# Patient Record
Sex: FEMALE | Race: WHITE | NOT HISPANIC OR LATINO | Employment: OTHER | URBAN - METROPOLITAN AREA
[De-identification: names, ages, dates, MRNs, and addresses within clinical notes are randomized per-mention and may not be internally consistent; named-entity substitution may affect disease eponyms.]

---

## 2017-04-03 ENCOUNTER — HOSPITAL ENCOUNTER (EMERGENCY)
Facility: CLINIC | Age: 32
Discharge: HOME | End: 2017-04-03
Attending: EMERGENCY MEDICINE | Admitting: EMERGENCY MEDICINE
Payer: MEDICARE

## 2017-04-03 VITALS — TEMPERATURE: 98.5 F | SYSTOLIC BLOOD PRESSURE: 128 MMHG | DIASTOLIC BLOOD PRESSURE: 110 MMHG | OXYGEN SATURATION: 99 %

## 2017-04-03 DIAGNOSIS — S06.0X0A CONCUSSION WITHOUT LOSS OF CONSCIOUSNESS, INITIAL ENCOUNTER: ICD-10-CM

## 2017-04-03 DIAGNOSIS — S00.03XA CONTUSION OF OCCIPITAL REGION OF SCALP, INITIAL ENCOUNTER: ICD-10-CM

## 2017-04-03 PROCEDURE — A9270 NON-COVERED ITEM OR SERVICE: HCPCS | Mod: GY | Performed by: EMERGENCY MEDICINE

## 2017-04-03 PROCEDURE — 99283 EMERGENCY DEPT VISIT LOW MDM: CPT

## 2017-04-03 PROCEDURE — 25000132 ZZH RX MED GY IP 250 OP 250 PS 637: Mod: GY | Performed by: EMERGENCY MEDICINE

## 2017-04-03 PROCEDURE — 25000125 ZZHC RX 250: Performed by: EMERGENCY MEDICINE

## 2017-04-03 RX ORDER — ONDANSETRON 4 MG/1
4 TABLET, ORALLY DISINTEGRATING ORAL EVERY 6 HOURS PRN
Qty: 10 TABLET | Refills: 0 | Status: SHIPPED | OUTPATIENT
Start: 2017-04-03

## 2017-04-03 RX ORDER — ACETAMINOPHEN 500 MG
1000 TABLET ORAL ONCE
Status: DISCONTINUED | OUTPATIENT
Start: 2017-04-03 | End: 2017-04-03

## 2017-04-03 RX ORDER — ONDANSETRON 4 MG/1
4 TABLET, ORALLY DISINTEGRATING ORAL ONCE
Status: DISCONTINUED | OUTPATIENT
Start: 2017-04-03 | End: 2017-04-03

## 2017-04-03 RX ORDER — ACETAMINOPHEN 325 MG/1
975 TABLET ORAL ONCE
Status: COMPLETED | OUTPATIENT
Start: 2017-04-03 | End: 2017-04-03

## 2017-04-03 RX ORDER — ONDANSETRON 4 MG/1
4 TABLET, ORALLY DISINTEGRATING ORAL ONCE
Status: COMPLETED | OUTPATIENT
Start: 2017-04-03 | End: 2017-04-03

## 2017-04-03 RX ADMIN — ONDANSETRON 4 MG: 4 TABLET, ORALLY DISINTEGRATING ORAL at 17:00

## 2017-04-03 RX ADMIN — ACETAMINOPHEN 975 MG: 325 TABLET, FILM COATED ORAL at 17:00

## 2017-04-03 ASSESSMENT — ENCOUNTER SYMPTOMS
NAUSEA: 1
VOMITING: 0
HEADACHES: 1
DIZZINESS: 1

## 2017-04-03 NOTE — ED AVS SNAPSHOT
Emergency Department    [address] [facility] [address]    Phone:  [phone]    Fax:  [phone]                                       Areli Schuster   MRN: [id number]    Department:   Emergency Department   Date of Visit:  4/3/2017           Patient Information     Date Of Birth          1985        Your diagnoses for this visit were:     Contusion of occipital region of scalp, initial encounter     Concussion without loss of consciousness, initial encounter        You were seen by Mathew Camacho MD.      Follow-up Information     Follow up with Dayana Javier    Specialty:  Internal Medicine    Contact information:    [facility]  [address] [facility] [address]  [phone]          Discharge Instructions         Scalp Contusion  A contusion is another word for bruise. It develops when small blood vessels break open and leak blood into the nearby area. A scalp contusion can result from a bump, hit, or fall. Symptoms can include changes in skin color (bruising). For instance, the skin may turn blue or black. Swelling and pain may also occur.  The swelling from the contusion should go down in a few days. Bruising and pain may take longer to go away.   Home care  General care    You may use acetaminophen to control pain, unless another pain medicine was prescribed. Don t take aspirin or NSAIDs (nonsteroidal anti-inflammatory drugs) without talking to your provider first. These medicines increase the risk of bleeding.    To help reduce swelling and pain, apply a cold source to the injured area for up to 20 minutes at a time. Do this as often as directed. Use a cold pack or bag of ice wrapped in a thin towel. Never put a cold source directly on your skin.    If you have cuts or scrapes around the site of the contusion, be sure to care for them as directed.  Note about concussion  Because the injury was to your head, it is possible that a concussion (mild brain  injury) could result. You don t have symptoms of a concussion at this time. But these can show up later. For this reason, you may be told to watch for symptoms of concussion once you re home. Seek emergency medical care if you have any of the symptoms below over the next hours to days:    Headache    Nausea or vomiting    Dizziness    Sensitivity to light or noise    Unusual sleepiness or grogginess    Trouble falling asleep    Personality changes    Vision changes    Memory loss    Confusion    Trouble walking or clumsiness    Loss of consciousness (even for a short time)    Inability to be awakened  During the time period that you re watching for concussion symptoms:    Don t drink alcohol or use sedatives or medicines that make you sleepy.    Don t drive or operate machinery.    Don t do anything strenuous, such as heavy lifting or straining.    Limit tasks that require concentration. This includes reading, watching TV, using a smartphone or computer, and playing video games.    Don t return to sports, exercise, or other activity that could result in another injury.  Ask your healthcare provider when you can safely resume these activities.   Follow-up care  Follow up with your healthcare provider, or as directed. If imaging tests were done, they will be reviewed by a doctor. You will be told the results and any new findings that may affect your care.  When to seek medical advice  Call your healthcare provider right away if any of these occur:    Pain that worsens or that can t be relieved with medicines    New or increased swelling or bruising    Fever of 100.4 F (38 C) or higher, or as directed by your provider    Redness, warmth, or drainage from the injured area    Any depression or bony abnormality in the injured area    Fluid drainage or bleeding from the nose or ears  Call 911  Call 911 right away if any of these occur:    Stiff neck    Weakness or numbness in any part of the body    Seizures         "[id number] [employer]. [address] [place] [address], [place] [address]. All rights reserved. This information is not intended as a substitute for professional medical care. Always follow your healthcare professional's instructions.          Concussion (No Wake-Up)    A concussion happens when you hit your head with enough force to shake up the brain. This may cause you to lose consciousness - be \"knocked out\" - but not always. Depending on how hard you hit your head, it will take from a few hours up to a few days to get better. Sometimes symptoms may last a few months or longer. This is called post-concussion syndrome.  At first, you may have a headache, nausea, vomiting, or dizziness. You may also have problems concentrating or remembering things. This is normal.  Symptoms should get better as the hours and days go by. Symptoms that get worse could be a sign of a more serious injury. This might be a bruise or bleeding in the brain. That s why it s important to watch for the warning signs listed below.  Home care  Follow these tips to help care for yourself at home:    During the next day (24 hours) someone must stay with you to check for the signs below.    If your face or scalp swells, apply an ice pack for 20 minutes every 1 to 2 hours. Do this until the swelling starts to go down. You can make an ice pack by putting ice cubes in a plastic bag and wrapping the bag in a towel. for 20 minutes every 1-2 hours until the swelling starts to go down.    You may use acetaminophen to control pain, unless another pain medicine was prescribed. If you have chronic liver or kidney disease, talk with your doctor before using these medicines. Also talk with your doctor if you ever had a stomach ulcer or GI bleeding.    For the next 24 hours:    Don t drink alcohol or take sedatives or medicines that make you sleepy.    Don t drive or operate machinery.    Avoid doing anything strenuous. Don t lift or strain.    Don t " return to sports or any activity that could cause you to hit your head until all symptoms are gone and you have been cleared by your doctor. A second head injury before fully recovering from the first one can lead to serious brain injury.  Follow-up care  Follow up with your doctor in 1 week, or as directed.  Note: A radiologist will review any X-rays or CT scans that were taken. You will be told of any new findings that may affect your care.  When to seek medical care  Get prompt medical attention if any of these occur:    Repeated vomiting    Headache or dizziness that is severe or gets worse    Unusual drowsiness, or unable to wake up as usual    Confusion or change in behavior or speech, or memory loss    Blurred vision    Convulsion (seizure)    Swelling on the scalp or face that gets worse    Redness, warmth, or pus from the swollen area    Fluid draining from or bleeding from the nose or ears  Â   Â  [id number] [employer]. [address] [place] [address]. All rights reserved. This information is not intended as a substitute for professional medical care. Always follow your healthcare professional's instructions.          24 Hour Appointment Hotline       To make an appointment at any [facility], call [phone] ([phone]). If you don't have a family doctor or clinic, we will help you find one. [facility] are conveniently located to serve the needs of you and your family.             Review of your medicines      START taking        Dose / Directions Last dose taken    ondansetron 4 MG ODT tab   Commonly known as:  ZOFRAN ODT   Dose:  4 mg   Quantity:  10 tablet        Place 1 tablet (4 mg) under the tongue every 6 hours as needed for nausea   Refills:  0          Our records show that you are taking the medicines listed below. If these are incorrect, please call your family doctor or clinic.        Dose / Directions Last dose taken    ADVAIR HFA IN        Refills:   0        ALBUTEROL IN        Refills:  0        HYDROcodone-acetaminophen 5-325 MG per tablet   Commonly known as:  NORCO   Dose:  1-2 tablet   Quantity:  10 tablet        Take 1-2 tablets by mouth every 4 hours as needed for moderate to severe pain   Refills:  0        ibuprofen 600 MG tablet   Commonly known as:  ADVIL/MOTRIN   Dose:  600 mg   Quantity:  21 tablet        Take 1 tablet (600 mg) by mouth every 6 hours as needed for moderate pain   Refills:  0        oxyCODONE 5 MG IR tablet   Commonly known as:  ROXICODONE   Dose:  5 mg   Quantity:  10 tablet        Take 1 tablet (5 mg) by mouth every 6 hours as needed for moderate to severe pain   Refills:  0        RITALIN PO        Refills:  0        WELLBUTRIN PO        Refills:  0        ZOFRAN PO        Refills:  0                Prescriptions were sent or printed at these locations (1 Prescription)                   Other Prescriptions                Printed at Department/Unit printer (1 of 1)         ondansetron (ZOFRAN ODT) 4 MG ODT tab                Orders Needing Specimen Collection     None      Pending Results     No orders found from 4/1/2017 to 4/4/2017.            Pending Culture Results     No orders found from 4/1/2017 to 4/4/2017.             Test Results from your hospital stay            Clinical Quality Measure: Blood Pressure Screening     Your blood pressure was checked while you were in the emergency department today. The last reading we obtained was  BP: (!) 128/110 . Please read the guidelines below about what these numbers mean and what you should do about them.  If your systolic blood pressure (the top number) is less than 120 and your diastolic blood pressure (the bottom number) is less than 80, then your blood pressure is normal. There is nothing more that you need to do about it.  If your systolic blood pressure (the top number) is 120-139 or your diastolic blood pressure (the bottom number) is 80-89, your blood pressure may be  "higher than it should be. You should have your blood pressure rechecked within a year by a primary care provider.  If your systolic blood pressure (the top number) is 140 or greater or your diastolic blood pressure (the bottom number) is 90 or greater, you may have high blood pressure. High blood pressure is treatable, but if left untreated over time it can put you at risk for heart attack, stroke, or kidney failure. You should have your blood pressure rechecked by a primary care provider within the next 4 weeks.  If your provider in the emergency department today gave you specific instructions to follow-up with your doctor or provider even sooner than that, you should follow that instruction and not wait for up to 4 weeks for your follow-up visit.        Thank you for choosing [facility]       Thank you for choosing [facility] for your care. Our goal is always to provide you with excellent care. Hearing back from our patients is one way we can continue to improve our services. Please take a few minutes to complete the written survey that you may receive in the mail after you visit with us. Thank you!        [employer] Information     [employer] lets you send messages to your doctor, view your test results, renew your prescriptions, schedule appointments and more. To sign up, go to [email or web address] . Click on \"Log in\" on the left side of the screen, which will take you to the Welcome page. Then click on \"Sign up Now\" on the right side of the page.     You will be asked to enter the access code listed below, as well as some personal information. Please follow the directions to create your username and password.     Your access code is: [email or web address]  Expires: 2017  5:02 PM     Your access code will  in 90 days. If you need help or a new code, please call your [facility] clinic or [phone].        Care EveryWhere ID     This is your Care EveryWhere ID. This could be used by other organizations to access your " [facility] medical records  [id number]        After Visit Summary       This is your record. Keep this with you and show to your community pharmacist(s) and doctor(s) at your next visit.

## 2017-04-03 NOTE — ED PROVIDER NOTES
History     Chief Complaint:  Fall      HPI   Areli Schuster is a 31 year old female with a history of cerebral palsy, TMJ, and concussions who presents for evaluation after a fall. The patient fell off of a bar stool two hours ago and shortly thereafter developed a headache, nausea without vomiting, dizziness, and jaw soreness, potentially from hitting her teeth on the bar stool during the fall.  The patient also requests a work note for today and tomorrow.     Allergies:  Celebrex    Medications:    Hydrocodone-acetaminophen (norco)   Ibuprofen (advil,motrin)   Fluticasone-salmeterol (advair hfa in)  Methylphenidate hcl (ritalin po)  Albuterol   Oxycodone (roxicodone)  Bupropion hcl (wellbutrin po)  Ondansetron hcl (zofran po)    Past Medical History:    Cerebral palsy  Depressive disorder  Gastro-oesophageal reflux disease    Past Surgical History:    Orthopedic surgery    Family History:    History reviewed.  No significant family history.      Social History:  Relationship status:    Tobacco use: Neg  Alcohol use: Neg  The patient presents alone.      Review of Systems   Gastrointestinal: Positive for nausea. Negative for vomiting.   Neurological: Positive for dizziness and headaches.   All other systems reviewed and are negative.    Physical Exam   First Vitals:  BP: 128/110  Temp: 98.5  F (36.9  C)  Temp src: Oral  SpO2: 99 % (04/03 1653)   Physical Exam  Constitutional: The patient is oriented to person, place, and time.   HENT:   Head: Tenderness and swelling of the occiput. Tender on left side of the jaw, but no step-offs or crepitous.  Right Ear: Normal  Left Ear: Normal  Nose: Nose normal.   Mouth/Throat: Oropharynx is clear and moist. No erythema or exudate.   Eyes: Conjunctivae and EOM are normal. Pupils are equal, round, and reactive to light. No discharge  Neck: Normal range of motion. Neck supple.   Cardiovascular: Normal rate, regular rhythm, no murmur gallops or rubs. Intact distal  pulses.    Pulmonary/Chest: CTA bilaterally. No wheezes rale or rhonchi.  Abdominal: Soft. Non tender.  No masses   Musculoskeletal: No edema. No bony deformity. Normal range of motion. No spinal tenderness.   Lymphadenopathy:     The patient has no cervical adenopathy.   Neurological: The patient is alert and oriented to person, place, and time. The patient has normal strength and normal reflexes. No cranial nerve deficit. Coordination normal.   Skin: Skin is warm and dry. No rash noted. The patient is not diaphoretic.   Psychiatric: The patient has a normal mood and affect.     Emergency Department Course   Interventions:  1700: Tylenol, 675 mg, PO  1700: Zofran-ODT, 4 mg, PO    Emergency Department Course:  Nursing notes and vitals reviewed.  I performed an exam of the patient as documented above.  Interventions were given to relieve discomfort.    Findings and plan explained to the Patient. Patient discharged home, status improved, with instructions regarding supportive care, medications, and reasons to return as well as the importance of close follow-up was reviewed.     Impression & Plan    Medical Decision Making:  Areli Schuster is a 31 year old female who presents after slipping off of a stool and striking the back of her head. She had no loss of consciousness, but has been quite nauseous and dizzy since the event. Physical exam is unremarkable, there is slight occipital tenderness. I suspect that she has a concussion based on her symptoms. Low concern for intracranial injury. I do not feel the CT is indicated. She can be safely discharged to home. She can use Tylenol or Motrin for pain, Zofran for nausea or vomiting, follow up with her PCP, and return for any problems.       Diagnosis:    ICD-10-CM    1. Contusion of occipital region of scalp, initial encounter S00.03XA    2. Concussion without loss of consciousness, initial encounter S06.0X0A        Disposition:  discharged to home    Discharge  Medication List as of 4/3/2017  5:02 PM      START taking these medications    Details   ondansetron (ZOFRAN ODT) 4 MG ODT tab Place 1 tablet (4 mg) under the tongue every 6 hours as needed for nausea, Disp-10 tablet, R-0, Local Print           I, Ana Herman, am serving as a scribe on 4/3/2017 at 4:52 PM to personally document services performed by Mathew Camacho MD, based on my observations and the provider's statements to me.     EMERGENCY DEPARTMENT       Mathew Camacho MD  04/03/17 2014

## 2017-04-03 NOTE — ED AVS SNAPSHOT
Emergency Department    [address] [place] [address]    Phone:  [phone]    Fax:  [phone]                                       Areli Schuster   MRN: [id number]    Department:   Emergency Department   Date of Visit:  4/3/2017           After Visit Summary Signature Page     I have received my discharge instructions, and my questions have been answered. I have discussed any challenges I see with this plan with the nurse or doctor.    ..........................................................................................................................................  Patient/Patient Representative Signature      ..........................................................................................................................................  Patient Representative Print Name and Relationship to Patient    ..................................................               ................................................  Date                                            Time    ..........................................................................................................................................  Reviewed by Signature/Title    ...................................................              ..............................................  Date                                                            Time

## 2017-04-03 NOTE — DISCHARGE INSTRUCTIONS
Scalp Contusion  A contusion is another word for bruise. It develops when small blood vessels break open and leak blood into the nearby area. A scalp contusion can result from a bump, hit, or fall. Symptoms can include changes in skin color (bruising). For instance, the skin may turn blue or black. Swelling and pain may also occur.  The swelling from the contusion should go down in a few days. Bruising and pain may take longer to go away.   Home care  General care    You may use acetaminophen to control pain, unless another pain medicine was prescribed. Don t take aspirin or NSAIDs (nonsteroidal anti-inflammatory drugs) without talking to your provider first. These medicines increase the risk of bleeding.    To help reduce swelling and pain, apply a cold source to the injured area for up to 20 minutes at a time. Do this as often as directed. Use a cold pack or bag of ice wrapped in a thin towel. Never put a cold source directly on your skin.    If you have cuts or scrapes around the site of the contusion, be sure to care for them as directed.  Note about concussion  Because the injury was to your head, it is possible that a concussion (mild brain injury) could result. You don t have symptoms of a concussion at this time. But these can show up later. For this reason, you may be told to watch for symptoms of concussion once you re home. Seek emergency medical care if you have any of the symptoms below over the next hours to days:    Headache    Nausea or vomiting    Dizziness    Sensitivity to light or noise    Unusual sleepiness or grogginess    Trouble falling asleep    Personality changes    Vision changes    Memory loss    Confusion    Trouble walking or clumsiness    Loss of consciousness (even for a short time)    Inability to be awakened  During the time period that you re watching for concussion symptoms:    Don t drink alcohol or use sedatives or medicines that make you sleepy.    Don t drive or operate  "machinery.    Don t do anything strenuous, such as heavy lifting or straining.    Limit tasks that require concentration. This includes reading, watching TV, using a smartphone or computer, and playing video games.    Don t return to sports, exercise, or other activity that could result in another injury.  Ask your healthcare provider when you can safely resume these activities.   Follow-up care  Follow up with your healthcare provider, or as directed. If imaging tests were done, they will be reviewed by a doctor. You will be told the results and any new findings that may affect your care.  When to seek medical advice  Call your healthcare provider right away if any of these occur:    Pain that worsens or that can t be relieved with medicines    New or increased swelling or bruising    Fever of 100.4 F (38 C) or higher, or as directed by your provider    Redness, warmth, or drainage from the injured area    Any depression or bony abnormality in the injured area    Fluid drainage or bleeding from the nose or ears  Call 911  Call 911 right away if any of these occur:    Stiff neck    Weakness or numbness in any part of the body    Seizures        3674-8802 [employer]. [address] [place] [address]. All rights reserved. This information is not intended as a substitute for professional medical care. Always follow your healthcare professional's instructions.          Concussion (No Wake-Up)    A concussion happens when you hit your head with enough force to shake up the brain. This may cause you to lose consciousness - be \"knocked out\" - but not always. Depending on how hard you hit your head, it will take from a few hours up to a few days to get better. Sometimes symptoms may last a few months or longer. This is called post-concussion syndrome.  At first, you may have a headache, nausea, vomiting, or dizziness. You may also have problems concentrating or remembering things. This is " normal.  Symptoms should get better as the hours and days go by. Symptoms that get worse could be a sign of a more serious injury. This might be a bruise or bleeding in the brain. That s why it s important to watch for the warning signs listed below.  Home care  Follow these tips to help care for yourself at home:    During the next day (24 hours) someone must stay with you to check for the signs below.    If your face or scalp swells, apply an ice pack for 20 minutes every 1 to 2 hours. Do this until the swelling starts to go down. You can make an ice pack by putting ice cubes in a plastic bag and wrapping the bag in a towel. for 20 minutes every 1-2 hours until the swelling starts to go down.    You may use acetaminophen to control pain, unless another pain medicine was prescribed. If you have chronic liver or kidney disease, talk with your doctor before using these medicines. Also talk with your doctor if you ever had a stomach ulcer or GI bleeding.    For the next 24 hours:    Don t drink alcohol or take sedatives or medicines that make you sleepy.    Don t drive or operate machinery.    Avoid doing anything strenuous. Don t lift or strain.    Don t return to sports or any activity that could cause you to hit your head until all symptoms are gone and you have been cleared by your doctor. A second head injury before fully recovering from the first one can lead to serious brain injury.  Follow-up care  Follow up with your doctor in 1 week, or as directed.  Note: A radiologist will review any X-rays or CT scans that were taken. You will be told of any new findings that may affect your care.  When to seek medical care  Get prompt medical attention if any of these occur:    Repeated vomiting    Headache or dizziness that is severe or gets worse    Unusual drowsiness, or unable to wake up as usual    Confusion or change in behavior or speech, or memory loss    Blurred vision    Convulsion (seizure)    Swelling on the  scalp or face that gets worse    Redness, warmth, or pus from the swollen area    Fluid draining from or bleeding from the nose or ears  Â   Â  5158-6802 [employer]. [address] [place] [address], [place] [address]. All rights reserved. This information is not intended as a substitute for professional medical care. Always follow your healthcare professional's instructions.

## 2017-04-03 NOTE — LETTER
EMERGENCY DEPARTMENT  [address] [facility] [place] [address]  [phone]    April 3, 2017    Areli Schuster  [address] [facility] [address] [phone] (home) none (work)    : 1985      To Whom it may concern:    Areli Schuster was seen in our Emergency Department today, April 3, 2017.  I expect her condition to improve over the next 2 days.  She may return to work/school when improved.    Sincerely,        Mathew Camacho

## 2019-08-03 ENCOUNTER — HOSPITAL ENCOUNTER (EMERGENCY)
Facility: CLINIC | Age: 34
End: 2019-08-03

## 2019-08-03 ENCOUNTER — HOSPITAL ENCOUNTER (EMERGENCY)
Facility: CLINIC | Age: 34
Discharge: HOME | End: 2019-08-03
Attending: EMERGENCY MEDICINE | Admitting: EMERGENCY MEDICINE
Payer: MEDICARE

## 2019-08-03 VITALS
OXYGEN SATURATION: 97 % | WEIGHT: 145 LBS | RESPIRATION RATE: 18 BRPM | HEIGHT: 63 IN | BODY MASS INDEX: 25.69 KG/M2 | TEMPERATURE: 98.6 F | SYSTOLIC BLOOD PRESSURE: 122 MMHG | DIASTOLIC BLOOD PRESSURE: 80 MMHG

## 2019-08-03 DIAGNOSIS — G80.9 CEREBRAL PALSY, UNSPECIFIED TYPE (H): ICD-10-CM

## 2019-08-03 DIAGNOSIS — G89.29 CHRONIC LOW BACK PAIN, UNSPECIFIED BACK PAIN LATERALITY, WITH SCIATICA PRESENCE UNSPECIFIED: ICD-10-CM

## 2019-08-03 DIAGNOSIS — M54.5 CHRONIC LOW BACK PAIN, UNSPECIFIED BACK PAIN LATERALITY, WITH SCIATICA PRESENCE UNSPECIFIED: ICD-10-CM

## 2019-08-03 DIAGNOSIS — M43.16 SPONDYLOLISTHESIS OF LUMBAR REGION: ICD-10-CM

## 2019-08-03 PROCEDURE — 25000132 ZZH RX MED GY IP 250 OP 250 PS 637: Mod: GY | Performed by: EMERGENCY MEDICINE

## 2019-08-03 PROCEDURE — 99283 EMERGENCY DEPT VISIT LOW MDM: CPT

## 2019-08-03 RX ORDER — OXYCODONE AND ACETAMINOPHEN 5; 325 MG/1; MG/1
2 TABLET ORAL ONCE
Status: COMPLETED | OUTPATIENT
Start: 2019-08-03 | End: 2019-08-03

## 2019-08-03 RX ORDER — LIDOCAINE 4 G/G
1 PATCH TOPICAL
Status: DISCONTINUED | OUTPATIENT
Start: 2019-08-03 | End: 2019-08-03

## 2019-08-03 RX ORDER — LIDOCAINE 4 G/G
1 PATCH TOPICAL
Status: DISCONTINUED | OUTPATIENT
Start: 2019-08-03 | End: 2019-08-03 | Stop reason: HOSPADM

## 2019-08-03 RX ORDER — LIDOCAINE 50 MG/G
1 PATCH TOPICAL EVERY 24 HOURS
Qty: 10 PATCH | Refills: 0 | Status: SHIPPED | OUTPATIENT
Start: 2019-08-03 | End: 2019-08-13

## 2019-08-03 RX ADMIN — OXYCODONE HYDROCHLORIDE AND ACETAMINOPHEN 2 TABLET: 5; 325 TABLET ORAL at 17:24

## 2019-08-03 ASSESSMENT — MIFFLIN-ST. JEOR: SCORE: 1318.91

## 2019-08-03 NOTE — ED PROVIDER NOTES
"  History   Chief Complaint:  Back Pain    HPI   Areli Nj is a 33 year old female with a history of cerebral palsy and chronic back pain with fibromyalgia who presents with back pain. The patient reports that she has recently had lower back pain increased from her chronic pain. She states that she has spinal surgery planned for the near future but is awaiting insurance approval; no date is set yet.  She was seen on 07/31 for chronic back pain and spondylolisthesis and was sent home with a prescription for 40 x 5 mg tablets of oxycodone for the pain. She was instructed to set up an appointment with pain management which has been scheduled for 08/21. The patient states that she is continuing to have the pain despite taking the oxycodone (2-3 tabs daily) and up to 4000 mg of Tylenol per day. She did take three oxycodone today and her last Tylenol was at 0800 this morning. Neither of these medications yielded satisfactory relief of pain, prompting her to come to the ED this evening. In the ED, the patient reports that her pain is worse with movement. At baseline, the patient has a \"limited\" ability to walk and uses a wheelchair to ambulate when pain is severe, which she attributes to her baseline cerebral palsy.  No flank pain or new urinary symptoms.  No history of kidney stones.  Her last period was about two weeks ago, normal for her. The patient denies bladder incontinence and dysuria.  No changes in bowel movements.     Allergies:  Celebrex     Medications:    Albuterol inhaler  Bupropion  Advair inhaler  Oxycodone  Ritalin     Past Medical History:    Cerebral palsy  Depressive disorder  GERD  Low back pain  Asthma  Chronic pain with fibromyalgia      Past Surgical History:    Subtalar arthroereisis      Family History:    History reviewed. No pertinent family history.      Social History:  Smoking status: Never smoker  Alcohol use: No  Drug use: No     Review of Systems   All other systems reviewed and " "are negative.      Physical Exam   Patient Vitals for the past 24 hrs:   BP Temp Temp src Heart Rate Resp SpO2 Height Weight   08/03/19 1752 122/80 -- -- -- -- -- -- --   08/03/19 1701 (!) 118/91 98.6  F (37  C) Oral 100 18 97 % 1.588 m (5' 2.5\") 65.8 kg (145 lb)     Physical Exam  General: Uncomfortable but nontoxic-appearing woman recumbent in room 2, several companions at bedside  HENT: mucous membranes moist  CV: rate as above, regular rhythm, no murmur audible, normal foot pulses  Resp: normal effort, clear throughout, no crackles or wheezing  GI: abdomen soft and nontender, no guarding  MSK:   Cervical spine: nontender with FROM  Thoracic spine: nontender, no CVAT  Lumbar spine: Mild diffuse tenderness to lower back   pelvis stable.  Can lift both legs off bed.    Skin: appropriately warm and dry, no erythema/ecchymosis/vesicles to back  Neuro: alert, clear speech, oriented, hip flexion/extension limited slightly by pain, SILT bilateral legs and feet  Psych: initially tearful, cooperative      Emergency Department Course     Interventions:  1724: Percocet 2 tablets PO  lidoderm patch topical    Emergency Department Course:  Past medical records, nursing notes, and vitals reviewed.  1708: I performed an exam of the patient as documented above. Clinical findings and plan explained to the patient.     I performed electronic chart review in [facility].  I looked up this patient's record in the [facility].    Patient discharged home with instructions regarding supportive care, medications, and reasons to return as well as the importance of close follow-up were reviewed.     Impression & Plan    Medical Decision Making:  Based on her history and exam, I think that her presenting discomfort is a manifestation of an exacerbation of her long-standing lower back pain.  She has been previously diagnosed with spondylolisthesis and is awaiting surgery though there are some insurance barriers.  She does not have any recent trauma, " fevers, or focal neurologic signs or symptoms to suggest the presence of a more dangerous etiology such as epidural abscess, hematoma, fracture, osteomyelitis, or other process such as kidney stone or pyelonephritis or vascular pathology, and so the patient and I readily agreed to defer any emergent testing.  I discussed her chronic and recurring pain policy with the patient.  I advised her that there is room to increase her oxycodone dosing, though she should do this only in conjunction with her primary clinic so that she does not run out before expected based on the amount prescribed.  No signs of intoxication or withdrawal state.  I do think the patient is in legitimate discomfort, and she was treated with oral opioids and given a Lidoderm patch.  The importance of close outpatient follow-up was reviewed.  She is encouraged to return here for sudden worsening at any hour.    Diagnosis:    ICD-10-CM    1. Chronic low back pain, unspecified back pain laterality, with sciatica presence unspecified M54.5     G89.29    2. Spondylolisthesis of lumbar region M43.16    3. Cerebral palsy, unspecified type (H) G80.9        Disposition:  Discharged to home.    Discharge Medications:     Started    lidocaine 5 % patch  Commonly known as:  LIDODERM  1 patch, Transdermal, EVERY 24 HOURS          This record was created at least in part using electronic voice recognition software, so please excuse any typographical errors.    Rhett Meeks  8/3/2019    EMERGENCY DEPARTMENT  I, Rhett Meeks, am serving as a scribe at 5:08 PM on 8/3/2019 to document services personally performed by Dionicio Simpson MD based on my observations and the provider's statements to me.        Dionicio Simpson MD  08/03/19 8848

## 2019-08-03 NOTE — ED TRIAGE NOTES
Patient complaining of worsening back pain.  Patient states that she is waiting for spinal surgery.

## 2019-08-03 NOTE — DISCHARGE INSTRUCTIONS
Discharge Instructions  Back Pain  You were seen today for back pain. Back pain can have many causes, but most will get better without surgery or other specific treatment. Sometimes there is a herniated ( slipped ) disc. We do not usually do MRI scans to look for these right away, since most herniated discs will get better on their own with time.  Today, we did not find any evidence that your back pain was caused by a serious condition. However, sometimes symptoms develop over time and cannot be found during an emergency visit, so it is very important that you follow up with your primary provider.  Generally, every Emergency Department visit should have a follow-up clinic visit with either a primary or a specialty clinic/provider. Please follow-up as instructed by your emergency provider today.    Return to the Emergency Department if:  You develop a fever with your back pain.   You have weakness or change in sensation in one or both legs.  You lose control of your bowels or bladder, or cannot empty your bladder (cannot pee).  Your pain gets much worse.     Follow-up with your provider:  Unless your pain has completely gone away, please make an appointment with your provider within one week. Most of the routine care for back pain is available in a clinic and not the Emergency Department. You may need further management of your back pain, such as more pain medication, imaging such as an X-ray or MRI, or physical therapy.    What can I do to help myself?  Remain Active -- People are often afraid that they will hurt their back further or delay recovery by remaining active, but this is one of the best things you can do for your back. In fact, staying in bed for a long time to rest is not recommended. Studies have shown that people with low back pain recover faster when they remain active. Movement helps to bring blood flow to the muscles and relieve muscle spasms as well as preventing loss of muscle strength.  Heat --  Using a heating pad can help with low back pain during the first few weeks. Do not sleep with a heating pad, as you can be burned.   Pain medications - You may take a pain medication such as Tylenol  (acetaminophen), Advil , Motrin  (ibuprofen) or Aleve  (naproxen).  If you were given a prescription for medicine here today, be sure to read all of the information (including the package insert) that comes with your prescription.  This will include important information about the medicine, its side effects, and any warnings that you need to know about.  The pharmacist who fills the prescription can provide more information and answer questions you may have about the medicine.  If you have questions or concerns that the pharmacist cannot address, please call or return to the Emergency Department.   Remember that you can always come back to the Emergency Department if you are not able to see your regular provider in the amount of time listed above, if you get any new symptoms, or if there is anything that worries you.      Discharge Instructions  Chronic Pain Management  You were seen today for an issue regarding chronic or recurrent pain. You may have a condition that gives you pain every day, or a condition that causes pain that keeps coming back, or several conditions involving pain.   We will evaluate you for your symptoms to ensure that there is no medical emergency. This evaluation usually takes the form of a good medical history (interview) and physical examination. Rarely, imaging (x-rays or CAT scans) and lab work (blood tests) may be necessary in addition to the history and examination. This approach is similar to our approach to other chronic/recurrent diseases where we evaluate for emergencies but leave much of the management of the problem to the regular provider/clinic.  We will offer suggestions to manage your pain but we do not generally utilize opiate pain medications for recurrent or chronic pain. There  is an ongoing public health crisis with respect to opiates and we are aware of the risks to our patients from opiate pain medications. Opiates are strong pain relievers but they carry significant risks including sedation, confusion, constipation, falls, as well as dependence, addiction, and overdose-related deaths. These medications represent a significant risk to your health and are therefore reserved for the management of select conditions associated with acute, severe pain. For many conditions, the risks of opiate treatment simply outweigh the benefits. Additionally, for patients with chronic/recurrent pain or who frequently use opiates, management of pain needs to be performed by a single physician/provider who can follow their care consistently. As a result, we generally do not provide refills of opiates or treat chronic pain with injected opiates in the Emergency Department. It is with your best interests in mind that we adhere to these widely accepted best practices.    As with other chronic diseases like diabetes and asthma, management of chronic pain is best performed by regular visits to the same provider. In the case of chronic pain, this may be your primary physician/provider, your neurologist or other specialist, or with a chronic pain clinic/provider.  If you have concerns about our policy, please discuss them with your primary care provider or pain specialist, who can contact us if necessary for further information or clarification.  If you were given a prescription for medicine here today, be sure to read all of the information (including the package insert) that comes with your prescription.  This will include important information about the medicine, its side effects, and any warnings that you need to know about.  The pharmacist who fills the prescription can provide more information and answer questions you may have about the medicine.  If you have questions or concerns that the pharmacist cannot  address, please call or return to the Emergency Department.   Remember that you can always come back to the Emergency Department if you develop any new symptoms or if there is anything that worries you.

## 2019-08-03 NOTE — ED AVS SNAPSHOT
Emergency Department  [address] [place] [address]  Phone:  [phone]  Fax:  [phone]                                    Areli Nj   MRN: [id number]    Department:   Emergency Department   Date of Visit:  8/3/2019           After Visit Summary Signature Page    I have received my discharge instructions, and my questions have been answered. I have discussed any challenges I see with this plan with the nurse or doctor.    ..........................................................................................................................................  Patient/Patient Representative Signature      ..........................................................................................................................................  Patient Representative Print Name and Relationship to Patient    ..................................................               ................................................  Date                                   Time    ..........................................................................................................................................  Reviewed by Signature/Title    ...................................................              ..............................................  Date                                               Time          22EPIC Rev 08/18

## 2019-10-15 ENCOUNTER — HOSPITAL ENCOUNTER (OUTPATIENT)
Dept: OCCUPATIONAL THERAPY | Facility: CLINIC | Age: 34
Setting detail: THERAPIES SERIES
End: 2019-10-15
Attending: PHYSICIAN ASSISTANT
Payer: MEDICARE

## 2019-10-15 PROCEDURE — 97542 WHEELCHAIR MNGMENT TRAINING: CPT | Mod: GO | Performed by: OCCUPATIONAL THERAPIST

## 2019-10-15 NOTE — PROGRESS NOTES
"   SEATING AND WHEELED MOBILITY ASSESSMENT  10/15/19 0800   Quick Adds   Quick Adds Certification;Current Power Wheelchair   General Information    Rehab Discipline OT   Funding Medicare/MA   Service Outpatient;Occupational Therapy;Seating/Wheeled Mobility Evaluation   Height 5'2\"   Weight 146  (230# when chair was obtained)   Start Of Care Date 10/15/19   Referring Physician Dayana Javier   Orders Evaluate And Treat As Indicated;Per Therapist Evaluation   Orders Date 09/19/19   Others Present at Evaluation Friend   Patient/Caregiver Goals Power mobility   Rehabilitation Technology Supplier Iesha [employer] from [employer]   Current Community Support Personal Care Attendant;Meals On Wheels;Transportation Service  (6.25 hrs/day, )   Patient role/Employment history Disabled;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 Yes   Have you fallen and had an injury in the past year? Yes  (concussions)   Is patient a fall risk? Yes   Fall screen comments falling a few times a week   Medical History   Onset Of Illness/injury Or Date Of Surgery 9/19/19   Medical Diagnosis Spastic Cereral Palsy   Medical History Depression, GERD, Chronic pain and fatigue, Fibromyalgia   Recent or Planned Surgeries L4-5 Spondylothesis needing lumbar fusion surgery, R carpal tunnel.  Mutliple LB surgery as a child due to orthopedic surgeries   Current Power Wheelchair   Age 4 years old    Quantum Q6 Edge   Cushion   (foam)   Back Solid Curved   Footrest Style Center mount   Headrest Yes   Settings Used Home;Outdoor Community Mobility;Primary Means of Transportation   Condition Beyond Further Justifiable Repair   Positioning Features Tilt Seat   Power Control Left Joystick   Current Equipment Requires Replacement   Rational for Equipment Changes HEavy duty full time user   Power Wheelchair Comments Major weight loss since obtaining " chair.  Back surgery coming up    Home Accessibility   Living Environment House   Primary Entrance Exposed Ramp   All Rooms Wheelchair Accessible Yes   Home Accessibility Comments Stairs up to bedroom    Community ADL   Transportation Transportation Services   Community Mobility Requirements Medical Appointments;Shopping   Cognitive/Visual/Hearing Status   Observations No Problems Observed During Evaluation   Vision Intact  (lasik)   Hearing Intact   ADL Status   Feeding Independent   Grooming/Hygiene Requires Assist   Dressing Requires Assist   Toileting Requires Assist;Uses Equipment;Incontinent   Bathing Requires Assist;Uses Equipment  (grab bars, shower chair)   Meal Preparation Unable   Home Management Unable   Fatigue   Fatigue Measure Score I need to nap suddently.  Daily activitie are a challenge   Balance   Unsupported Sitting Balance Uses Upper Extremities for Balance   Sitting Balance in Chair Uses Upper Extremities for Balance   Standing Balance Uses Upper Extremities for Balance   Ambulation   Ambulation Ambulatory   Ambulation Assist Requires Assist   Ambulation Equipment 4 Wheeled Walker with Seat   Ambulation Comments 10-50 depending on fatigue and pain.  Always with assist and a walker.  Then very fatigued and in pain.   Transfers   Transfer Assist Moderate Assist   Transfer Method Stand Pivot   Sleep/Rest   Sleep Surface/Equipment Regular bed and rail   Wheelchair Ability   Wheelchair Ability Quick Adds Power Chair;Wheelchair Use   Power Wheelchair Propulsion   Operate Power Wheelchair Standard Joystick;Independent   Wheelchair Use   Ability to Perform Weight Shifts Assist   Bed Confined Without Wheelchair? Yes   Hours in Wheelchair Daily 8   Hours Spent Alone Daily 12  (rare)   Neuromuscular   History of Pressure Sores No   Current Pressure Sores No   Pain Yes   Pain Location Low back   Pain Scale  9  (awaiting surgery)   Coordination UE Impaired;LE Impaired  (spastic)   Tone Spasticity   Sensory  Deficits Reported Arms and legs    Head and Neck   Head and Neck Position Functional   Upper Extremities   Shoulder Position Functional on Right   UE ROM Full ROM with L spasticity >R limiting end ranges   UE Strength 4/5   Dominance Right   Pelvis   Anterior/Posterior Pelvis Position Posterior Tilt   Trunk   Anterior/Posterior Trunk Position Increased Thoracic Kyphosis   Lower Extremities   Hip Position Adducted;Partially Flexible   LE Strength 2+/5   Foot Positioning Plantar flexed   LE Comments Edema in LE with prolonged sitting   Patient Measurements   Other see atp notes   Education Assessment   Barriers to Learning Physical   Preferred Learning Style Listening;Demonstration   Assessment/Plan   Criteria for Skilled Interventions Met Yes, Treatment Indicated   Treatment Diagnosis impaired participation in MRADLS   Therapy Frequency once   Planned Therapy Interventions Wheelchair Management/Propulsion Training   Planned Therapy Interventions Comments Educated patient on recline, seat elevator and power elevating leg seat options to assist with increased wheelchair use due to progressing back pain and need for surgery.   Mesurements and assesment of chair completed.  Carney depth needed to accomodate weight loss.   Risks and benefits of treatment have been explained Yes   Patient/family & other staff in agreement with plan of care Yes   Comments Specs to be completed   Session Time   OT Wheelchair Management Minutes (89189) 60   Certification   Certification date from 10/15/19   Certification date to 10/15/19   Adult OT Eval Goals   OT Eval Goals (Adult) 1    OT Goal 1   Goal Identifier power mobility   Goal Description Patient/family demonstrates understanding of equipment for independent mobility, including benefits/limitations   Target Date 10/15/19   Date Met 10/15/19   Electronically signed by:  Sweta LASSITER, ATP      Occupational Therapist, Assistive   [phone]      fax:  [phone]      [email or web address], [address]  [address]  [place]   [address]

## 2019-11-15 NOTE — PROGRESS NOTES
REQUISITION AND JUSTIFICATION FOR DURABLE MEDICAL EQUIPMENT    Patient Name:  Areli Nj  MR #:  [id number]  :  1985  Age/Gender:  34 year old female  Visit Date:  Areli Nj seen for seating and wheeled mobility evaluation by Sweta SAMUEL/L,ATP and ATP from [facility] on 10/15/19.    CLINICAL CRITERIA FOR MOBILITY ASSISTIVE EQUIPMENT  Coverage Criteria Per Local Coverage Determination  A) Areli has mobility limitations due to Spastic Cerebral Palsy, Depression, GERD, Chronic Pain, fibromyalgia, R carpal tunnel surgery, L 4-5 fusion to be done sonn that significantly impairs her ability to participate in all of her mobility-related activities of daily living (MRADL). Specifically affected are toileting (being able to get there in time to prevent accidents), dressing, and bathing (getting into the bathroom of designated place). Current equipment used is 4 year old Quantum Q6 edge. This patient needs the new equipment requested to be able to continue to participate in MRADLs. Please see additional documentation in the seating and wheeled mobility report for details.   Areli currently uses an older model of the recommended equipment. Areli is very willing and physically / cognitively able to use the recommended equipment to assist her with mobility-related activities of daily living and general mobility. A group 3 power wheelchair is being requested because it has better suspension for a smooth ride and has the capabilities of expandable electronics to operate the  power seat functions Areli needs for independence with her activities of daily living. A Group 2 power wheelchair does not have sufficient electronics to support this patient's progressive neurological deficits due to CP.  B) Areli's mobility limitation cannot be sufficiently and safely resolved by the use of an appropriately fitted cane or walker because she is non ambulatory, walking 10-50 ft with assist and walker as  able only. Strength of legs is 2+/5 for one maximal repetition. Fatigue also impacts this patient's ability to ambulate, regardless of the gait aid.    C) Areli does not have sufficient upper extremity function to self-propel an optimally-configured manual wheelchair in her home to perform MRADLs during a typical day due to limitations in strength, endurance, range of motion, and coordination. Distance and time to propel a light weight manual wheelchair Nt as she is a full time power w/c user.  Strength of arms is 4/5 with spasticity.  D)  Areli is not able to use a POV/scooter because it will not fit in her home environment. Areli is unable to safely transfer to and from a POV, unable to operate the Whiteyboarder steering system, and unable to maintain postural stability and position while operating the POV. Areli needs more appropriate seating and positioning than any scooter seat provides.  E) The need for this equipment is LIFETIME.   RECOMMENDATIONS FOR MOBILITY BASE, SEATING SYSTEM AND COMPONENTS  Quantum Q6 Edge 3MP-SS - this mid wheel drive power wheelchair is needed for this patient to continue to have independent mobility and to be able to allow her to complete or assist in all of her mobility related activities of daily living (MRADLs). This wheelchair will also have the seating and positioning system and seat function she needs to be able to use and tolerate the wheelchair full time, and have functional and comfortable positioning for a full day's activities. Areli has Spastic Cerebral Palsy, Depression, GERD, Chronic Pain, fibromyalgia, R carpal tunnel surgery, L 4-5 fusion which impairs her ability to move in her home without the use of the requested wheelchair. She lives in home with ramp access.    100 amp Q-Logic 3 EX controller -  Needed for operation of the joystick for mobility and seat functions    Harness for expandable controller - needs to be inline for communication between the controller and  the joystick    Q-Logic 3 EX Joystick - this is the joystick needed to be used by this patient for moving this wheelchair and also controlling the movement of the seat functions.    Medium ball handle- needed for safe driving of chair.    Retract 4 swing away joystick mount - needed to be able to move the joystick out of the way during transfers, or when at the desk using the computer, or at the table eating, so as not to inadvertently hit the joystick, thus moving the wheelchair or turning the power on or off without her knowledge.    ROBERTO Balance Power Tilt and Recline  - This seating function combination is needed for this patient to be able to perform independent weight shifts and also to be able to change her seated position without the request of a care giver. Areli requires a powered seating system with both tilt and recline to optimize pressure distribution and postural repositioning.   The power tilt seat allows her to change her position by rotating rearward without changing the angle of her hips or knees. Due to atrophy of her buttocks she is at high risk of developing pressure ulcers if not able to change her position. Due to compromised ability to exert herself for weight shifting, the power tilt seat allows her to do this by operating it through the joystick. She can also use a slight degree of tilt for assisting in her seating and positioning for normal functions during the day a to assist keeping her in a midline, erect and upright position by using the effect of gravity.   The power reclining back feature also reduces pressures by allowing her to change the angle of her hips and knees into a more open and supine / recumbent position. This increases her tolerance and be able to remain in the requested wheelchair for a complete day and not be dependent on care givers to change her position or transfer her to another surface for comfort or resting. The recline feature can be used to access the perineal  area necessary for toileting assistance. The power tilt seat and power recline back are necessary features that allow tasks to be done by the care giver without the need for transferring back to bed for these activities.  The medical justification for these seat functions is consistent with the RESNA Position Papers regarding these seat function interventions (Jayesh et al., 2009). These seat functions will also reduce upper extremity strain per the Paralyzed [place] [place] Guidelines for upper limb preservation (Valentininger, et al., 2005).    Power elevating seat - Vertical movement is necessary to allow Areli to function and participate in a three-dimensional world. This seat feature will increase her ability to reach by bringing her closer for better access with weak arms while reaching into cupboards or closets.  During the home trial she was able to use this feature to access all cupboards and sink from a seated postion.  She is transferring independently with increased effort due to spasticity in UE. This requested seat lift feature promotes safety with and improved independence with lateral transfers by allowing a level transfer or transfer from a higher to lower surface, which is gravity-assisted.  It also facilitates forward transfer by allowing legs, hips to be more extended, thereby lessening the strength required for the user to perform a stand-pivot transfer.  Power seat elevation also allows the user to have eye contact with others and reduces cervical strain and pain (including headaches from poor positioning). Vertical rise also provides psycho-social benefits of being on peer level and speaking eye-to-eye. Additionally, seat elevation allows certain medications to be kept out of reach of children but remain accessible to the user.    Transit option- Areli must frequently travel to medical and therapy appointments in the community and travel using paratransit/public transit/wheelchair adapted  "van.  Due to weakness, in-coordination, and safety concerns she is unable to independently initiate and safely complete a transfer from his/her wheelchair to the crash tested automobile, paratransit or public transportation vehicle seat.  The concept of a wheelchair transportation safety system allows for a securement system for anchoring the wheelchair to the floor of the motor vehicle, has an occupant restraint to hold the individual in their wheelchair and prevent secondary injury from hitting obstacles within the vehicle or being ejected from the vehicle and includes identified securement points to correctly secure the wheelchair to the floor, an occupant pelvic safety belt, and the strength and design features.    ROBERTO comfort Solid curved and padded back support - firm and contoured back support is needed to support Areli's thoracolumbar area in an upright and midline position, with appropriate support pads as needed. This will provide support whether she is in the upright or tilted/reclined position. This back support is essential to provide sufficient lateral contour to maximize her postural alignment and minimize her tendency to develop scoliosis and other secondary complications.    Lateral wedges- needed to provide support for weak trunk muscles in order to provide upright posture for optimal environmental engagement. Hardware needed to be able to remove for safety during transfers.      Stealth Comfort Plus 10\" headrest and mounting hardware - needed to keep Patricias head in an erect, midline and upright position whether she is in the upright, tilted or reclined position. Her head and neck need to be supported as well as the rest of her body.    Stealth multi axis mounting hardware - needed for mounting the requested headrest in the most appropriate position on the back of the wheelchair for optimal head and neck support and to be able to be removed for transfers.     Sure tex hip belt- needed to support " weak trunk     Push Handles- corpus seat- two handles mounted to the backrest frame to allow the wheelchair to be pushed by a caregiver if the wheelchair user is unable to move the wheelchair (power source depleted, fine tuning position of wheelchair for vehicle transport, etc.)     Cell phone bazan- allows for safe use of phone while using chair.    Power Positioning electronics to be operated through the [employer] logic controller - this is needed to allow her to use the joystick of the wheelchair for control of mobility and operation of the power seat function. This is important for this patient with limited strength and coordination so as not to require a separate switch for operation of the seat function.    Attendant control- joystick style controller to allow Areli's caregivers to steer/drive chair from behind. It is necessary for use when the she becomes fatigued, following medical episodes, and in potentially unsafe conditions like an busy, crowded areas, traffic intersections, steep ramps, or in emergency situations. It can also be used to maneuver the chair into an appropriate position for transfers or vehicle transport.    Power elevating foot legrest- Allows for elevation and extension of lower extremities while elevating. This can improve circulation and prevent/reduce edema (with recline/tilt combination).  The lower legs of this wheelchair user act as a reservoir for fluid accumulation due to lack of movement. Elevation of this patient's legs above the level of the heart (left atrium) is recommended as part of the management of edema in conjunction with other measures such as support garments  This patient has lower extremity dependent edema while sitting in her wheelchair, which resolves with elevation of her legs. This feature, when used with the power recline back or tilt seat, can increase Areli's sitting tolerance while positioning her in a more natural position. This can also be helpful if she  "fatigues and requires rests throughout the day, without transferring her back to bed.  Due to inability to perform a functional weight shifting, she is at risk for developing pressure ulcers. With the use of this feature it will allow for optimal weight shifting in conjunction with tilt and recline.    Per RESNA white paper on elevating legrests, using elevating legrests and tilting more than 30 degrees in combination with full recline significantly improves lower leg hemodynamics status as measured by near-infrared spectroscopy (Anne et al., 2010)  Additionally, these legrests can improve ground clearance to navigate thresholds and slopes and still allowing the legs to achieve a tight 90 degree position for typical driving conditions. This position shortens the overall functional wheelbase for improved maneuverability    Calf supports- angle and height adjustable pads that attach to the legrests. These padded calf supports are necessary to support her lower legs when the leg is elevated, or to keep feet from falling behind the footplates when in the neutral seated position. Calf supports are especially important when using a tilt-in-space power seating system and/or power articulating elevating legrests. The padding provides an additional surface to distribute pressure, and has a mild contour to accommodate the lower leg.    6\" outside edge- keeps feet aligned safety on chair footplate.    Acta Embrace seat cushion - this pressure distribution and positioning seat cushion will optimally  distribute seating pressures to prevent pressure ulcers, but also provide a stable base of support for her to use during MRADLs.    2 Batteries and  - gel sealed, and two are necessary to power the wheelchair. They are maintenance free and are safe for travel on the road or in the air. They are necessary to provide reliable use of the power wheelchair on a single charge.    This equipment is reasonable and necessary with " reference to accepted standards of medical practice and treatment of this patient's condition and is not being recommended as a convenience item. Without this recommended equipment, she is highly likely to sustain injuries from falls, develop pressure sores or postural compensation, and/or be bed confined, which those costs far exceed the cost of the requested equipment.    Electronically signed by:  Sweta LASSITER, ATP      Occupational Therapist, Assistive   [phone]      fax: [phone]      [email or web address]  November 15, 2019    I have read and concur with the above recommendations.    Physician Printed Name __________________________________________    Physician SIgnature  _____________________________________________    Date of SIgnature ______________________________    Physician Phone  ______________________________

## 2022-05-01 ENCOUNTER — TRANSFERRED RECORDS (OUTPATIENT)
Dept: HEALTH INFORMATION MANAGEMENT | Facility: CLINIC | Age: 37
End: 2022-05-01
Payer: MEDICARE

## 2022-05-04 ENCOUNTER — TRANSCRIBE ORDERS (OUTPATIENT)
Dept: MATERNAL FETAL MEDICINE | Facility: CLINIC | Age: 37
End: 2022-05-04
Payer: MEDICARE

## 2022-05-04 ENCOUNTER — MEDICAL CORRESPONDENCE (OUTPATIENT)
Dept: HEALTH INFORMATION MANAGEMENT | Facility: CLINIC | Age: 37
End: 2022-05-04
Payer: MEDICARE

## 2022-05-04 ENCOUNTER — TRANSFERRED RECORDS (OUTPATIENT)
Dept: HEALTH INFORMATION MANAGEMENT | Facility: CLINIC | Age: 37
End: 2022-05-04
Payer: MEDICARE

## 2022-05-04 DIAGNOSIS — O26.90 PREGNANCY RELATED CONDITION, ANTEPARTUM: Primary | ICD-10-CM

## 2022-05-06 ENCOUNTER — TRANSCRIBE ORDERS (OUTPATIENT)
Dept: MATERNAL FETAL MEDICINE | Facility: CLINIC | Age: 37
End: 2022-05-06
Payer: MEDICARE

## 2022-05-06 DIAGNOSIS — O26.90 PREGNANCY RELATED CONDITION, ANTEPARTUM: Primary | ICD-10-CM

## 2022-05-06 DIAGNOSIS — O09.90 HIGH-RISK PREGNANCY, UNSPECIFIED TRIMESTER: Primary | ICD-10-CM

## 2022-05-24 ENCOUNTER — PRE VISIT (OUTPATIENT)
Dept: MATERNAL FETAL MEDICINE | Facility: CLINIC | Age: 37
End: 2022-05-24
Payer: MEDICARE

## 2022-05-30 NOTE — PROGRESS NOTES
Maternal-Fetal Medicine Consultation    Areli Nj  : 1985  MRN: [id number]    REFERRAL:  Areli Nj is a 36 year old sent by KADEEM Rico from [facility] for MFM consultation.    HPI:  Areli Nj is a 36 year old  at 12w2d by LMP consistent with 8w0d US here for MFM consultation regarding maternal spastic quadriplegia.    She is here with her PCA, Vanda.     She has a history of spastic quadriplegia, which she was diagnosed with around 11 months of life. She was followed closely at [facility] and also follows with her primary care provider, Dr. Stevan Oconnor at [facility]. She is able to ambulate, but uses a wheelchair for longer distances. Despite being bilateral, spasticity is worse on her left side. She also has chronic pain and fibromyalgia for which she is on oxycodone 5 mg 1-2 times per day. She also uses medical marijuana for the same and has a pain contract with her primary provider, Dr. Oconnor. She has a history of anxiety and was previously on Wellbutrin and Cymbalta, though has not been taking this. She notes that her mood has been stable off of these medications. She has a history of seizures in childhood, but none as an adult and is not on any anti-epileptic medications.     During her last pregnancy, she overall tolerated it well. She did not note worsening of symptoms related to her CP. She developed itching in the third trimester, was diagnosed with cholestasis of pregnancy, and underwent induction of labor at 37 weeks' gestation. Despite her history of L4 - L5 lumbar fusion, she epidural, though noted that it only worked on one side. Once she was completely dilated, she pushed 3-4 times and did not require an assisted second stage. She voices concerns over the care she received during her last pregnancy. She did not feel that the FOB was treated as such. She knows that the delivery was during COVID, but recalls him being treated like a  visitor instead of the FOB. This is the reason that she is planning to delivery within another system.    Ms. Nj also has a history of asthma, though this is well controlled. She only uses albuterol as needed, though this is not on a daily or weekly basis.       Prenatal Care:  Primary OB care this pregnancy has been with MsHumza LandenKADEEM from [facility]    Obstetrics History:  OB History    Para Term  AB Living   3 1 1 0 1 1   SAB IAB Ectopic Multiple Live Births   1 0 0 0 1      # Outcome Date GA Lbr Misha/2nd Weight Sex Delivery Anes PTL Lv   3 Current            2 SAB 21 6w0d          1 Term 21 37w0d  2.58 kg (5 lb 11 oz)  Vag-Spont   FILIPPO       Past Medical History:  Past Medical History:   Diagnosis Date     Anxiety      Asthma      Cerebral palsy (H)      Depressive disorder      Fibromyalgia      Gastro-oesophageal reflux disease      History of cholestasis during pregnancy        Past Surgical History:  Past Surgical History:   Procedure Laterality Date     HIP SURGERY       LUMBAR FUSION      L4-L5     ORTHOPEDIC SURGERY         Current Medications:  Oxycodone 5mg   Prilosec  Albuterol  Zofran    Allergies:  Celebrex [celecoxib]    Social History:   Denies use of alcohol, illicit drugs or smoking.    Family History:  Denies history of genetic disorders, preeclampsia, thromboembolic disease, bleeding disorders, developmental delay.    ROS:  10-point ROS negative except as in HPI     PHYSICAL EXAM:  /72 (BP Location: Left arm, Patient Position: Sitting, Cuff Size: Adult Regular)   Pulse 57   Resp 20   LMP 2022   SpO2 98%      Gen Appear: NAD    ANA LUISA  2022:   INDICATION  ---------------------------------------------------------------------------------------------------------  Advanced Maternal Age--Multigravida, Cerebral  Palsy    METHOD  ---------------------------------------------------------------------------------------------------------  Transabdominal ultrasound examination. View: Sufficient     PREGNANCY  ---------------------------------------------------------------------------------------------------------  Jama pregnancy. Number of fetuses: 1     DATING  ---------------------------------------------------------------------------------------------------------                                           Date                                Details                                                                                      Gest. age                      SAMARA  LMP                                  3/5/2022                                                                                                                           12 w + 3 d                     12/10/2022  Prior assessment               4/27/2022                         GA: 8 w + 0 d                                                                            12 w + 6 d                     12/7/2022  U/S                                   5/31/2022                         based upon CRL                                                                        12 w + 5 d                     12/8/2022  Assigned dating                  Dating performed on 05/31/2022, based on the LMP                                                            12 w + 3 d                     12/10/2022     GENERAL EVALUATION  ---------------------------------------------------------------------------------------------------------  Cardiac activity present.  Placenta posterior.  Cord vessels normal insertion.  Amniotic fluid normal amount.     FETAL BIOMETRY  ---------------------------------------------------------------------------------------------------------  FHR                                        161                     bpm  CRL                                         63.4                    mm                         12w 5d         Hadlock  NT                                           1.65                   mm     FETAL ANATOMY  ---------------------------------------------------------------------------------------------------------  Face: Nasal bone present  Neck: Normal Nuchal Translucency     The following structures appear normal:  Cranium. Abdominal wall. Stomach. Bladder. Arms. Legs.     MATERNAL STRUCTURES  ---------------------------------------------------------------------------------------------------------  Cervix                                  Visualized                                             Appearance: Appears Closed  Right Ovary                          Not visualized  Left Ovary                            Not visualized        RECOMMENDATION  ---------------------------------------------------------------------------------------------------------  Thank you for referring your patient for a first trimester ultrasound with nuchal translucency screening.     We discussed the results of the ultrasound with the patient.     Your patient had cell free DNA screening today. The results of this screen will be forwarded to you as soon as they are available. Because cell free DNA screening does not  screen for open neural tube defects, the patient should be offered MSAFP screening at 15-20 weeks gestation. Comprehensive ultrasound is recommended at 18-20  weeks.     Areli was seen today for consultation in our Maternal Fetal Medicine clinic. Please see consult note in Epic for full details and pregnancy recommendations from today's  visit.     Return to primary provider for continued prenatal care.     If you have questions regarding today's evaluation or if we can be of further service, please contact the Maternal-Fetal Medicine Center.     **Fetal anomalies may be present but not detected**                                                                    IMPRESSION  ---------------------------------------------------------------------------------------------------------  1. Jama intrauterine pregnancy at 12w3d gestational age here for aneuploidy risk assessment.  2. The nuchal translucency measurement is within the normal range.  3. The nasal bone was visualized.  4. Measurements consistent with established dates.      ASSESSMENT/PLAN:  Areli Nj is a 36 year old  at 12w3d by LMP consistent with 8w0d US here for M consultation regarding:     Maternal spastic quadriplegia, cerebral palsy  Cerebral palsy (CP) refers to a heterogenous group of disorders that affects movement, tone, balance and posture. These conditions are thought to be due to abnormalities in fetal or  brain development. Spastic cerebral palsy is the most common type of CP and affects about 80% of people with CP. Spastic quadriplegia is a severe form of spastic CP in which all of the extremities are affected. Additionally, this type of CP can be associated with intellectual disability, impairment in communication and hearing, feeding difficulties, and seizures.There are studies evaluating pregnancy outcomes among people with maternal cerebral palsy which demonstrated an association with  birth and small for gestational age birth compared to patients without CP. We would anticipate that Ms. Nj would tolerate pregnancy well given her previous pregnancy. She had a vaginal delivery during that pregnancy without an assisted second stage. We discussed that we would anticipate that this would be the case during this pregnancy, though the option of an assisted second stage remains available.     We also reviewed Ms. Nj's medications that she uses for management of her cerebral palsy. She is on scheduled oxycodone for chronic pain. We discussed that opioid use in pregnancy is associated with  abstinence syndrome (NORA). In this condition, when neonates  are no longer exposed to various medications that they were exposed to in utero, they may demonstrate withdrawal. This can manifest in the following: sleep/wake cycle disturbances, autonomic dysfunction, overstimulation, difficulty feeding, poor weight gain, loose stools. Treatment is aimed at keeping the mother-baby dyad together, utilizing skin to skin, breastfeeding and cluster feeds, as well as medications for  symptomatic relief. Given the risk of NORA, notification of the pediatric team is recommended at the time of delivery. We also briefly reviewed medical cannabis use in pregnancy. It is known that marijuana metabolites cross the placenta, and although its use is not associated with birth defects, it can be associated with fetal growth restriction.    History of cholestasis of pregnancy  We reviewed her history of cholestasis complicating her last pregnancy. We reviewed that intrahepatic cholestasis of pregnancy (ICP) is a hepatic disorder characterized by pruritus and an elevation in serum bile acid levels which most commonly develops in the 2nd/3rd trimester of pregnancy. The incidence has been estimated to range from 0.3% to 15% in various populations, with most of the estimates ranging from 0.3% to 0.5%. In nonpregnant patients, cholestasis is most often a sign of an underlying hepatic disease such as biliary tract disease (common) and autoimmune disease (rare). In pregnancy, cholestasis is most often self-limited and resolves after delivery. Although intrahepatic cholestasis of pregnancy poses little risk for women with the exception of a possible increased risk of preeclampsia, this condition carries a significant risk for the fetus, including complications such as  delivery, meconium-stained amniotic fluid, and stillbirth. We discussed that patients with a history of ICP are at risk for recurrence with the risk of recurrence possibly being as high as 90%.    Advanced maternal age  We  discussed that advanced maternal age (AMA) is associated with increased risks of miscarriage, aneuploidy, gestational diabetes, hypertensive disorders of pregnancy, fetal growth restriction,  birth (both iatrogenic and spontaneous), stillbirth,  delivery, and complications of coexistent medical conditions. We discussed genetic screening/testing options and the patient has decided to proceed with NIPT. We discussed the recommendation for a specialized ultrasound between 18-20 weeks. Delivery is typically recommended by 39 weeks.  delivery should be reserved for the routine obstetric indications.     Asthma  We discussed that asthma during pregnancy has been associated with an increased incidence of  mortality and increased risks of preeclampsia,  birth, hypertensive disorders, and low birthweight infants. In addition the effect of pregnancy on asthma appears to be quite variable with some patients improving and others experiencing worsening of symptoms. Overall, asthma is expected to worsen during pregnancy in approximately ~35 percent, improve in ~30 percent, and remain unchanged in ~30 percent.      Recommendations:  Genetic screening  - She was seen by genetic counseling today and underwent normal nuchal translucency. She desires NIPT testing, via [facility] which was drawn at our clinic today.     Medications  - We reviewed risks/benefits of oxycodone and medical marijuana use in pregnancy. Patient plans to continue lowest dose needed to achieve symptomatic relief.     Maternal antepartum management  - Anesthesia consultation in the third trimester given history of lumbar fusion  - Close monitoring for cholestasis of pregnancy. If patient is diagnosed with cholestasis this pregnancy, patient should be seen by MFM for consultation regarding current management recommendations.     Ultrasound surveillance  - Level II US between 18-20 weeks  - Serial growth US beginning at 28 weeks  given medications    Timing and mode of delivery  - Notify pediatric team at the time of delivery given medication use.  - Anticipate full term vaginal delivery, unless otherwise clinically indicated. If needed, operative vaginal delivery may be utilized if needed to assist in expulsive efforts.  - Close monitoring of mood, especially in the postpartum period, given history of worsening of mood symptoms during this period in her previous pregnancy.      The patient was seen and evaluated with Dr. Sierra.    Thank you for allowing us to participate in the care of your patient. Please do not hesitate to contact us if you have further questions regarding the management of your patient.       Melania Acosta MD  Maternal Fetal Medicine Fellow    [facility] Attending Attestation  I have seen and evaluated the patient with Dr. Acosta. I reviewed her chart and agree with the above documented assessment and plan. I spent a total of 30 minutes on the date of this encounter including preparing to see the patient (reviewing medical records/tests), in direct face-to-face contact with the patient during her visit with the majority (>50%) spent counseling and discussing the plan of care, documenting the visit in the electronic medical record, and communicating with other health care professionals and/or care coordination.Please see her note for specific details; I have made the necessary edits/additions.  The patient was also seen for an ultrasound in the Maternal-Fetal Medicine Center at the [facility] today.  For a detailed report of the ultrasound examination, please see the ultrasound report which can be found under the imaging tab.  Yue Sierra MD  Maternal Fetal Medicine Physician

## 2022-05-31 ENCOUNTER — OFFICE VISIT (OUTPATIENT)
Dept: MATERNAL FETAL MEDICINE | Facility: CLINIC | Age: 37
End: 2022-05-31
Attending: OBSTETRICS & GYNECOLOGY
Payer: MEDICARE

## 2022-05-31 ENCOUNTER — LAB (OUTPATIENT)
Dept: LAB | Facility: CLINIC | Age: 37
End: 2022-05-31
Attending: OBSTETRICS & GYNECOLOGY
Payer: MEDICARE

## 2022-05-31 ENCOUNTER — OFFICE VISIT (OUTPATIENT)
Dept: MATERNAL FETAL MEDICINE | Facility: CLINIC | Age: 37
End: 2022-05-31
Attending: NURSE PRACTITIONER
Payer: MEDICARE

## 2022-05-31 ENCOUNTER — HOSPITAL ENCOUNTER (OUTPATIENT)
Dept: ULTRASOUND IMAGING | Facility: CLINIC | Age: 37
Discharge: HOME | End: 2022-05-31
Attending: OBSTETRICS & GYNECOLOGY
Payer: MEDICARE

## 2022-05-31 VITALS
HEART RATE: 57 BPM | OXYGEN SATURATION: 98 % | RESPIRATION RATE: 20 BRPM | SYSTOLIC BLOOD PRESSURE: 120 MMHG | DIASTOLIC BLOOD PRESSURE: 72 MMHG

## 2022-05-31 DIAGNOSIS — G80.0 SPASTIC QUADRIPLEGIC CEREBRAL PALSY (H): ICD-10-CM

## 2022-05-31 DIAGNOSIS — O09.90 HIGH-RISK PREGNANCY, UNSPECIFIED TRIMESTER: ICD-10-CM

## 2022-05-31 DIAGNOSIS — O09.521 SUPERVISION OF ELDERLY MULTIGRAVIDA IN FIRST TRIMESTER: ICD-10-CM

## 2022-05-31 DIAGNOSIS — Z87.59 HISTORY OF CHOLESTASIS DURING PREGNANCY: ICD-10-CM

## 2022-05-31 DIAGNOSIS — Z87.19 HISTORY OF CHOLESTASIS DURING PREGNANCY: ICD-10-CM

## 2022-05-31 DIAGNOSIS — O09.521 MULTIGRAVIDA OF ADVANCED MATERNAL AGE IN FIRST TRIMESTER: ICD-10-CM

## 2022-05-31 DIAGNOSIS — O26.90 PREGNANCY RELATED CONDITION, ANTEPARTUM: ICD-10-CM

## 2022-05-31 DIAGNOSIS — O09.91 SUPERVISION OF HIGH RISK PREGNANCY IN FIRST TRIMESTER: Primary | ICD-10-CM

## 2022-05-31 DIAGNOSIS — O09.521 SUPERVISION OF ELDERLY MULTIGRAVIDA IN FIRST TRIMESTER: Primary | ICD-10-CM

## 2022-05-31 PROCEDURE — 99203 OFFICE O/P NEW LOW 30 MIN: CPT | Mod: 25 | Performed by: OBSTETRICS & GYNECOLOGY

## 2022-05-31 PROCEDURE — 76813 OB US NUCHAL MEAS 1 GEST: CPT

## 2022-05-31 PROCEDURE — 76813 OB US NUCHAL MEAS 1 GEST: CPT | Mod: 26 | Performed by: OBSTETRICS & GYNECOLOGY

## 2022-05-31 PROCEDURE — 96040 HC GENETIC COUNSELING, EACH 30 MINUTES: CPT | Performed by: GENETIC COUNSELOR, MS

## 2022-05-31 PROCEDURE — 36415 COLL VENOUS BLD VENIPUNCTURE: CPT

## 2022-05-31 NOTE — NURSING NOTE
Areli seen in clinic today at 12w3d gestation for GC/NT/MFM Consult d/t AMA, CP, fibromyalgia, anx/dep/PTSD. VS obtained. Meds and allergies reviewed. Dr. Acosta and Dr. Sierra met with pt and discussed POC. Plan for L2. Future visits scheduled at . Pt discharged stable via wheelchair to outpatient lab per this RN as pt states she is generally wheelchair-bound for long distances.      Domonique Mahmood RN

## 2022-05-31 NOTE — PROGRESS NOTES
[facility]aternal [facility]etal Medicine [facility]  Genetic Counseling Consult    Patient: Areli Nj YOB: 1985   Date of Service: 22      Areli Nj was seen at [facility] for genetic consultation to discuss the options for screening and testing for fetal chromosome abnormalities.  The indication for genetic counseling is advanced maternal age. She was accompanied by her PCAVanda.        Impression/Plan:   1.  Areli had an ultrasound and blood draw for NIPT (NIPS test through [facility] lab).  Results are expected within 7-10 days, and will be available in [facility].  We will contact her to discuss the results, and a copy will be forwarded to the office of the referring OB provider. Areli Nj provided verbal permission for results to be left on her voicemail. She would like the sex chromosome results mailed to her Vanda WHALEN's home, address provided.     2.  Maternal serum AFP (single marker screen) is recommended after 15 weeks to screen for open neural tube defects. A quad screen should not be performed.    3.  An 18-20 week comprehensive ultrasound is standard of care for all women 35 or older at delivery. Areli is scheduled for this ultrasound with [facility] on 2022     Pregnancy History:   /Parity:     Age at Delivery: 37 year old  SAMARA: 12/10/2022, by Last Menstrual Period  Gestational Age: 12w3d    Areli s pregnancy history is significant for:   o 2021: 37w term delivery, induction of labor for cholestasis, healthy daughter  o 2021: 6w4d SAB, subchorionic hemorrhage     Medical History:   Areli was born about ten weeks premature and has cerebral palsy. She has chronic fatigue and fibromyalgia.     Areli had a Maternal Fetal Medicine consultation today. Please see those notes for more details     Family History:   A three-generation pedigree was not obtained due to time constraints and focus on other information.      The following significant findings were reported by Areli:    Areli and her partner, Obed, have a healthy daughter, Rosa Maria Singleton also has a paternal half sister that has cerebral palsy as well. That sister was part of a twin pregnancy that delivered prematurely around four months before due date and was no more than 2 pounds birthweight.     Otherwise, the reported family history is negative for multiple miscarriages, stillbirths, birth defects, known genetic conditions, and consanguinity.       Carrier Screening:   Expanded carrier screening for mutations in a large panel of genes associated with autosomal recessive conditions including cystic fibrosis, spinal muscular atrophy, and others, is now available.    Carrier screening was beyond the scope of our discussion today. If she I and /or her partner are interested in further discussing the option of carrier screening, MFM would be available to assist in coordination if desired.        Risk Assessment for Chromosome Conditions:   We explained that the risk for fetal chromosome abnormalities increases with maternal age. We discussed specific features of common chromosome abnormalities, including Down syndrome, trisomy 13, trisomy 18, and sex chromosome trisomies.      At age 37 at midtrimester, the risk to have a baby with Down syndrome is 1 in 129.     At age 37 at midtrimester, the risk to have a baby with any chromosome abnormality is 1 in 65.     Testing Options:   We discussed the following options:   Non-invasive Prenatal Testing (NIPT)    Maternal plasma cell-free DNA testing; first trimester ultrasound with nuchal translucency and nasal bone assessment is recommended, when appropriate    Screens for fetal trisomy 21, trisomy 13, trisomy 18, and sex chromosome aneuploidy    Cannot screen for open neural tube defects; maternal serum AFP after 15 weeks is recommended     Chorionic villus sampling (CVS)    Invasive procedure typically performed  in the first trimester by which placental villi are obtained for the purpose of chromosome analysis and/or other prenatal genetic analysis    Diagnostic results; >99% sensitivity for fetal chromosome abnormalities    Cannot test for open neural tube defects; maternal serum AFP after 15 weeks is recommended     Genetic Amniocentesis    Invasive procedure typically performed in the second trimester by which amniotic fluid is obtained for the purpose of chromosome analysis and/or other prenatal genetic analysis    Diagnostic results; >99% sensitivity for fetal chromosome abnormalities    AFAFP measurement tests for open neural tube defects     Comprehensive (Level II) ultrasound: Detailed ultrasound performed between 18-22 weeks gestation to screen for major birth defects and markers for aneuploidy.    We reviewed the benefits and limitations of this testing.  Screening tests provide a risk assessment specific to the pregnancy for certain fetal chromosome abnormalities, but cannot definitively diagnose or exclude a fetal chromosome abnormality.  Follow-up genetic counseling and consideration of diagnostic testing is recommended with any abnormal screening result.     Diagnostic tests carry inherent risks- including risk of miscarriage- that require careful consideration.  These tests can detect fetal chromosome abnormalities with greater than 99% certainty.  Results can be compromised by maternal cell contamination or mosaicism, and are limited by the resolution of cytogenetic G-banding technology.  There is no screening nor diagnostic test that can detect all forms of birth defects or mental disability.     It was a pleasure to be involved with Areli raphael. Face-to-face time of the meeting was 30 minutes.    Charley Montana MS, [facility]  Licensed Genetic Counselor   [facility]  Maternal Fetal Medicine  [email or web address]  [email or web address]  Office: [phone]  Pager [phone]  M: [phone]    Fax: [phone]

## 2022-06-07 ENCOUNTER — TELEPHONE (OUTPATIENT)
Dept: MATERNAL FETAL MEDICINE | Facility: CLINIC | Age: 37
End: 2022-06-07
Payer: MEDICARE

## 2022-06-07 LAB — SCANNED LAB RESULT: NORMAL

## 2022-06-07 NOTE — TELEPHONE ENCOUNTER
Called and discussed normal NIPT results with Areli. Results indicate NO ANEUPLOIDY DETECTED for chromosomes 21, 18, 13, or sex chromosomes. Sex results was mailed to Areli's PCA per her request.     This puts her current pregnancy at low risk for Down syndrome, trisomy 18, trisomy 13 and sex chromosome abnormalities. This test is reported to have the following sensitivities: Down syndrome- 99%, trisomy 18- 98%, and trisomy 13- 98%. Although these results are reassuring, this does not replace a standard chromosome analysis from a chorionic villus sampling or amniocentesis.     Level II ultrasound is recommended, given AMA, and scheduled for 07/11/2022.    MSAFP is the appropriate second trimester screening test for open neural tube defects; the maternal quad screen is not recommended.    Her results will be faxed to her primary OB to review.    Charley Montana MS, [facility]  Licensed Genetic Counselor   [facility]  Maternal Fetal Medicine  [email or web address]  [email or web address]  Office: [phone]  Pager [phone]  [facility]: [phone]   Fax: [phone]

## 2022-07-11 ENCOUNTER — OFFICE VISIT (OUTPATIENT)
Dept: MATERNAL FETAL MEDICINE | Facility: CLINIC | Age: 37
End: 2022-07-11
Attending: OBSTETRICS & GYNECOLOGY
Payer: MEDICARE

## 2022-07-11 ENCOUNTER — HOSPITAL ENCOUNTER (OUTPATIENT)
Dept: ULTRASOUND IMAGING | Facility: CLINIC | Age: 37
Discharge: HOME | End: 2022-07-11
Attending: OBSTETRICS & GYNECOLOGY
Payer: MEDICARE

## 2022-07-11 DIAGNOSIS — O09.90 HIGH-RISK PREGNANCY, UNSPECIFIED TRIMESTER: Primary | ICD-10-CM

## 2022-07-11 DIAGNOSIS — O26.90 PREGNANCY RELATED CONDITION, ANTEPARTUM: ICD-10-CM

## 2022-07-11 PROCEDURE — 76811 OB US DETAILED SNGL FETUS: CPT

## 2022-07-11 PROCEDURE — 76811 OB US DETAILED SNGL FETUS: CPT | Mod: 26 | Performed by: OBSTETRICS & GYNECOLOGY

## 2022-07-11 NOTE — PROGRESS NOTES
"Please see \"Imaging\" tab under \"Chart Review\" for details of today's visit.    Sharon Stallworth MD PhD  Maternal Fetal Medicine     "

## 2023-01-16 ENCOUNTER — LAB (OUTPATIENT)
Dept: LAB | Facility: CLINIC | Age: 38
End: 2023-01-16
Payer: MEDICARE

## 2023-01-16 DIAGNOSIS — Z32.01 PREGNANCY TEST POSITIVE: ICD-10-CM

## 2023-01-16 DIAGNOSIS — Z32.01 PREGNANCY TEST POSITIVE: Primary | ICD-10-CM

## 2023-01-16 LAB
HCG INTACT+B SERPL-ACNC: 2047 MIU/ML
PROGEST SERPL-MCNC: 18 NG/ML

## 2023-01-16 PROCEDURE — 84702 CHORIONIC GONADOTROPIN TEST: CPT

## 2023-01-16 PROCEDURE — 84144 ASSAY OF PROGESTERONE: CPT

## 2023-01-16 PROCEDURE — 36415 COLL VENOUS BLD VENIPUNCTURE: CPT

## 2023-02-08 ENCOUNTER — TRANSFERRED RECORDS (OUTPATIENT)
Dept: HEALTH INFORMATION MANAGEMENT | Facility: CLINIC | Age: 38
End: 2023-02-08
Payer: MEDICARE

## 2023-02-08 ENCOUNTER — MEDICAL CORRESPONDENCE (OUTPATIENT)
Dept: HEALTH INFORMATION MANAGEMENT | Facility: CLINIC | Age: 38
End: 2023-02-08
Payer: MEDICARE

## 2023-02-09 ENCOUNTER — TRANSCRIBE ORDERS (OUTPATIENT)
Dept: MATERNAL FETAL MEDICINE | Facility: CLINIC | Age: 38
End: 2023-02-09
Payer: MEDICARE

## 2023-02-09 DIAGNOSIS — O26.90 PREGNANCY RELATED CONDITION, ANTEPARTUM: Primary | ICD-10-CM

## 2023-04-11 ENCOUNTER — TRANSFERRED RECORDS (OUTPATIENT)
Dept: MATERNAL FETAL MEDICINE | Facility: HOSPITAL | Age: 38
End: 2023-04-11
Payer: MEDICARE

## 2023-04-17 ENCOUNTER — PRE VISIT (OUTPATIENT)
Dept: MATERNAL FETAL MEDICINE | Facility: HOSPITAL | Age: 38
End: 2023-04-17
Payer: MEDICARE

## 2023-04-23 ENCOUNTER — HEALTH MAINTENANCE LETTER (OUTPATIENT)
Age: 38
End: 2023-04-23

## 2023-04-24 ENCOUNTER — ANCILLARY PROCEDURE (OUTPATIENT)
Dept: ULTRASOUND IMAGING | Facility: HOSPITAL | Age: 38
End: 2023-04-24
Attending: STUDENT IN AN ORGANIZED HEALTH CARE EDUCATION/TRAINING PROGRAM
Payer: MEDICARE

## 2023-04-24 ENCOUNTER — OFFICE VISIT (OUTPATIENT)
Dept: MATERNAL FETAL MEDICINE | Facility: HOSPITAL | Age: 38
End: 2023-04-24
Attending: STUDENT IN AN ORGANIZED HEALTH CARE EDUCATION/TRAINING PROGRAM
Payer: MEDICARE

## 2023-04-24 DIAGNOSIS — G80.0 SPASTIC QUADRIPLEGIC CEREBRAL PALSY (H): ICD-10-CM

## 2023-04-24 DIAGNOSIS — O09.522 SUPERVISION OF ELDERLY MULTIGRAVIDA IN SECOND TRIMESTER: Primary | ICD-10-CM

## 2023-04-24 DIAGNOSIS — Z87.19 HISTORY OF CHOLESTASIS DURING PREGNANCY: ICD-10-CM

## 2023-04-24 DIAGNOSIS — O09.90 HIGH-RISK PREGNANCY, UNSPECIFIED TRIMESTER: Primary | ICD-10-CM

## 2023-04-24 DIAGNOSIS — O26.90 PREGNANCY RELATED CONDITION, ANTEPARTUM: ICD-10-CM

## 2023-04-24 DIAGNOSIS — Z36.89 ENCOUNTER FOR FETAL ANATOMIC SURVEY: ICD-10-CM

## 2023-04-24 DIAGNOSIS — Z87.59 HISTORY OF CHOLESTASIS DURING PREGNANCY: ICD-10-CM

## 2023-04-24 DIAGNOSIS — O09.521 MULTIGRAVIDA OF ADVANCED MATERNAL AGE IN FIRST TRIMESTER: ICD-10-CM

## 2023-04-24 PROCEDURE — 999N000069 HC STATISTIC GENETIC COUNSELING, < 16 MIN: Performed by: GENETIC COUNSELOR, MS

## 2023-04-24 PROCEDURE — 99213 OFFICE O/P EST LOW 20 MIN: CPT | Mod: 25 | Performed by: STUDENT IN AN ORGANIZED HEALTH CARE EDUCATION/TRAINING PROGRAM

## 2023-04-24 PROCEDURE — 76811 OB US DETAILED SNGL FETUS: CPT | Mod: 26 | Performed by: STUDENT IN AN ORGANIZED HEALTH CARE EDUCATION/TRAINING PROGRAM

## 2023-04-24 PROCEDURE — 76811 OB US DETAILED SNGL FETUS: CPT
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
## 2023-04-24 NOTE — NURSING NOTE
Areli Nj is a  at 18w3d who presents to [facility] for GC and L2 ultrasound. Pt denies bldg/lof/change in discharge, contractions, headache, vision changes, chest pain/SOB or edema. SBAR given to Dr. Falk, see note in Epic.

## 2023-04-24 NOTE — PROGRESS NOTES
Please see the full imaging report from the ViewPoint program under the imaging tab.    Kitty Falk MD  Maternal Fetal Medicine

## 2024-06-29 ENCOUNTER — HEALTH MAINTENANCE LETTER (OUTPATIENT)
Age: 39
End: 2024-06-29

## 2024-07-23 ENCOUNTER — TRANSFERRED RECORDS (OUTPATIENT)
Dept: HEALTH INFORMATION MANAGEMENT | Facility: CLINIC | Age: 39
End: 2024-07-23
Payer: MEDICARE

## 2024-08-12 ENCOUNTER — TRANSFERRED RECORDS (OUTPATIENT)
Dept: MULTI SPECIALTY CLINIC | Facility: CLINIC | Age: 39
End: 2024-08-12
Payer: MEDICARE

## 2024-08-12 LAB
ALT SERPL-CCNC: 33 U/L
AST SERPL-CCNC: 26 U/L (ref 10–40)
CREATININE (EXTERNAL): 0.6 MG/DL (ref 0.55–1.02)
GFR ESTIMATED (EXTERNAL): >60 ML/MIN/1.73M2
GLUCOSE (EXTERNAL): 95 MG/DL (ref 70–100)
POTASSIUM (EXTERNAL): 3.7 MMOL/L (ref 3.5–5.1)

## 2024-08-26 ENCOUNTER — HOSPITAL ENCOUNTER (OUTPATIENT)
Facility: CLINIC | Age: 39
Discharge: HOME | End: 2024-08-27
Attending: ORTHOPAEDIC SURGERY | Admitting: ORTHOPAEDIC SURGERY
Payer: MEDICARE

## 2024-08-26 ENCOUNTER — ANESTHESIA (OUTPATIENT)
Dept: SURGERY | Facility: CLINIC | Age: 39
End: 2024-08-26
Payer: MEDICARE

## 2024-08-26 ENCOUNTER — ANESTHESIA EVENT (OUTPATIENT)
Dept: SURGERY | Facility: CLINIC | Age: 39
End: 2024-08-26
Payer: MEDICARE

## 2024-08-26 ENCOUNTER — APPOINTMENT (OUTPATIENT)
Dept: RADIOLOGY | Facility: CLINIC | Age: 39
End: 2024-08-26
Attending: ORTHOPAEDIC SURGERY
Payer: MEDICARE

## 2024-08-26 DIAGNOSIS — M54.12 CERVICAL RADICULOPATHY: ICD-10-CM

## 2024-08-26 DIAGNOSIS — J45.20 MILD INTERMITTENT ASTHMA WITHOUT COMPLICATION: Primary | ICD-10-CM

## 2024-08-26 PROCEDURE — 99204 OFFICE O/P NEW MOD 45 MIN: CPT | Performed by: HOSPITALIST

## 2024-08-26 PROCEDURE — 250N000013 HC RX MED GY IP 250 OP 250 PS 637: Performed by: ANESTHESIOLOGY

## 2024-08-26 PROCEDURE — 258N000003 HC RX IP 258 OP 636: Performed by: ANESTHESIOLOGY

## 2024-08-26 PROCEDURE — 258N000003 HC RX IP 258 OP 636: Performed by: ORTHOPAEDIC SURGERY

## 2024-08-26 PROCEDURE — 250N000009 HC RX 250

## 2024-08-26 PROCEDURE — 250N000011 HC RX IP 250 OP 636: Performed by: PHYSICIAN ASSISTANT

## 2024-08-26 PROCEDURE — 250N000009 HC RX 250: Performed by: ORTHOPAEDIC SURGERY

## 2024-08-26 PROCEDURE — 250N000009 HC RX 250: Performed by: ANESTHESIOLOGY

## 2024-08-26 PROCEDURE — 250N000012 HC RX MED GY IP 250 OP 636 PS 637: Performed by: ANESTHESIOLOGY

## 2024-08-26 PROCEDURE — 250N000011 HC RX IP 250 OP 636: Performed by: ANESTHESIOLOGY

## 2024-08-26 PROCEDURE — 710N000010 HC RECOVERY PHASE 1, LEVEL 2, PER MIN: Performed by: ORTHOPAEDIC SURGERY

## 2024-08-26 PROCEDURE — C1889 IMPLANT/INSERT DEVICE, NOC: HCPCS | Performed by: ORTHOPAEDIC SURGERY

## 2024-08-26 PROCEDURE — 360N000085 HC SURGERY LEVEL 5 W/ FLUORO, PER MIN: Performed by: ORTHOPAEDIC SURGERY

## 2024-08-26 PROCEDURE — 999N000182 XR SURGERY CARM FLUORO GREATER THAN 5 MIN: Mod: TC

## 2024-08-26 PROCEDURE — 370N000017 HC ANESTHESIA TECHNICAL FEE, PER MIN: Performed by: ORTHOPAEDIC SURGERY

## 2024-08-26 PROCEDURE — 272N000001 HC OR GENERAL SUPPLY STERILE: Performed by: ORTHOPAEDIC SURGERY

## 2024-08-26 PROCEDURE — 999N000141 HC STATISTIC PRE-PROCEDURE NURSING ASSESSMENT: Performed by: ORTHOPAEDIC SURGERY

## 2024-08-26 PROCEDURE — 250N000005 HC OR RX SURGIFLO HEMOSTATIC MATRIX 10ML 199102S OPNP: Performed by: ORTHOPAEDIC SURGERY

## 2024-08-26 PROCEDURE — 250N000011 HC RX IP 250 OP 636: Performed by: ORTHOPAEDIC SURGERY

## 2024-08-26 PROCEDURE — C1713 ANCHOR/SCREW BN/BN,TIS/BN: HCPCS | Performed by: ORTHOPAEDIC SURGERY

## 2024-08-26 PROCEDURE — 250N000013 HC RX MED GY IP 250 OP 250 PS 637: Performed by: ORTHOPAEDIC SURGERY

## 2024-08-26 PROCEDURE — 250N000011 HC RX IP 250 OP 636

## 2024-08-26 PROCEDURE — 258N000003 HC RX IP 258 OP 636

## 2024-08-26 DEVICE — IMPLANTABLE DEVICE: Type: IMPLANTABLE DEVICE | Site: SPINE CERVICAL | Status: FUNCTIONAL

## 2024-08-26 DEVICE — OSSDSIGN CATALYST IS AN OSTEOCONDUCTIVE, RESORBABLE, POROUS, 100% NANOSYNTHETIC CALCIUM PHOSPHATE BONE VOID FILLER. OSSDSIGN CATALYST CONTAINS 5.8 WT% SILICON-SUBSTITUTED CALCIUM PHOSPHATE GRANULES SUSPENDED IN A RESORBABLE POLYMER GEL. THE FINAL, FINISHED OSSDSIGN CATALYST IS 30 WT% GRANULES AND 70 WT% POLYMER GEL. THE HIGH SURFACE AREA POROUS GRANULES HAVE BEEN DESIGNED TO DELIVER CONSISTENT AND RAPID BONE INGROWTH, REMODELING AND CELL-MEDIATED RESORPTION DURING THE BONE HEALING PROCESS. THE AQUEOUS POLYMER GEL PHASE BINDS THE HIGHLY POROUS GRANULES INTO A MOLDABLE, PLIABLE FORMULATION WHICH ENABLES OSSDSIGN CATALYST TO BE IMPLANTED DIRECTLY FROM THE PACKAGING WITHOUT ANY FURTHER GELATION, MIXING OR GRAFT SETTING TIME. OSSDSIGN CATALYST IS PROVIDED STERILE TO THE END USER IN 2.5 CC, 5 CC AND 10 CC SIZES. (510(K) NO. K193075)
Type: IMPLANTABLE DEVICE | Site: SPINE CERVICAL | Status: FUNCTIONAL
Brand: OSSDSIGN CATALYST

## 2024-08-26 RX ORDER — DEXAMETHASONE SODIUM PHOSPHATE 4 MG/ML
INJECTION, SOLUTION INTRA-ARTICULAR; INTRALESIONAL; INTRAMUSCULAR; INTRAVENOUS; SOFT TISSUE
Status: DISCONTINUED
Start: 2024-08-26 | End: 2024-08-26 | Stop reason: HOSPADM

## 2024-08-26 RX ORDER — VITAMIN B COMPLEX
25 TABLET ORAL DAILY
COMMUNITY

## 2024-08-26 RX ORDER — NALOXONE HYDROCHLORIDE 0.4 MG/ML
0.2 INJECTION, SOLUTION INTRAMUSCULAR; INTRAVENOUS; SUBCUTANEOUS
Status: DISCONTINUED | OUTPATIENT
Start: 2024-08-26 | End: 2024-08-27 | Stop reason: HOSPADM

## 2024-08-26 RX ORDER — APREPITANT 40 MG/1
40 CAPSULE ORAL ONCE
Status: COMPLETED | OUTPATIENT
Start: 2024-08-26 | End: 2024-08-26

## 2024-08-26 RX ORDER — ACETAMINOPHEN 325 MG/1
650 TABLET ORAL EVERY 4 HOURS PRN
Status: DISCONTINUED | OUTPATIENT
Start: 2024-08-29 | End: 2024-08-27 | Stop reason: HOSPADM

## 2024-08-26 RX ORDER — DEXAMETHASONE SODIUM PHOSPHATE 10 MG/ML
INJECTION, SOLUTION INTRAMUSCULAR; INTRAVENOUS PRN
Status: DISCONTINUED | OUTPATIENT
Start: 2024-08-26 | End: 2024-08-26

## 2024-08-26 RX ORDER — DIAZEPAM 5 MG
5-10 TABLET ORAL
Status: DISCONTINUED | OUTPATIENT
Start: 2024-08-26 | End: 2024-08-27 | Stop reason: HOSPADM

## 2024-08-26 RX ORDER — CEFAZOLIN SODIUM/WATER 2 G/20 ML
2 SYRINGE (ML) INTRAVENOUS SEE ADMIN INSTRUCTIONS
Status: DISCONTINUED | OUTPATIENT
Start: 2024-08-26 | End: 2024-08-26 | Stop reason: HOSPADM

## 2024-08-26 RX ORDER — SCOLOPAMINE TRANSDERMAL SYSTEM 1 MG/1
1 PATCH, EXTENDED RELEASE TRANSDERMAL
Status: DISCONTINUED | OUTPATIENT
Start: 2024-08-26 | End: 2024-08-27 | Stop reason: HOSPADM

## 2024-08-26 RX ORDER — OXYCODONE HYDROCHLORIDE 5 MG/1
10 TABLET ORAL EVERY 4 HOURS PRN
Status: DISCONTINUED | OUTPATIENT
Start: 2024-08-26 | End: 2024-08-27 | Stop reason: HOSPADM

## 2024-08-26 RX ORDER — ONDANSETRON 4 MG/1
4 TABLET, ORALLY DISINTEGRATING ORAL EVERY 6 HOURS PRN
Status: DISCONTINUED | OUTPATIENT
Start: 2024-08-26 | End: 2024-08-27 | Stop reason: HOSPADM

## 2024-08-26 RX ORDER — METHOCARBAMOL 500 MG/1
500 TABLET, FILM COATED ORAL ONCE
Status: COMPLETED | OUTPATIENT
Start: 2024-08-26 | End: 2024-08-26

## 2024-08-26 RX ORDER — NALOXONE HYDROCHLORIDE 0.4 MG/ML
0.4 INJECTION, SOLUTION INTRAMUSCULAR; INTRAVENOUS; SUBCUTANEOUS
Status: DISCONTINUED | OUTPATIENT
Start: 2024-08-26 | End: 2024-08-27 | Stop reason: HOSPADM

## 2024-08-26 RX ORDER — MULTIVITAMIN,THERAPEUTIC
1 TABLET ORAL DAILY
Status: DISCONTINUED | OUTPATIENT
Start: 2024-08-27 | End: 2024-08-27 | Stop reason: HOSPADM

## 2024-08-26 RX ORDER — LIDOCAINE HYDROCHLORIDE 10 MG/ML
INJECTION, SOLUTION INFILTRATION; PERINEURAL PRN
Status: DISCONTINUED | OUTPATIENT
Start: 2024-08-26 | End: 2024-08-26

## 2024-08-26 RX ORDER — ONDANSETRON 2 MG/ML
4 INJECTION INTRAMUSCULAR; INTRAVENOUS EVERY 6 HOURS PRN
Status: DISCONTINUED | OUTPATIENT
Start: 2024-08-26 | End: 2024-08-27 | Stop reason: HOSPADM

## 2024-08-26 RX ORDER — GABAPENTIN 300 MG/1
300 CAPSULE ORAL
Status: DISCONTINUED | OUTPATIENT
Start: 2024-08-26 | End: 2024-08-26 | Stop reason: HOSPADM

## 2024-08-26 RX ORDER — PROPOFOL 10 MG/ML
INJECTION, EMULSION INTRAVENOUS CONTINUOUS PRN
Status: DISCONTINUED | OUTPATIENT
Start: 2024-08-26 | End: 2024-08-26

## 2024-08-26 RX ORDER — PROPOFOL 10 MG/ML
INJECTION, EMULSION INTRAVENOUS PRN
Status: DISCONTINUED | OUTPATIENT
Start: 2024-08-26 | End: 2024-08-26

## 2024-08-26 RX ORDER — CEFAZOLIN SODIUM/WATER 2 G/20 ML
2 SYRINGE (ML) INTRAVENOUS
Status: COMPLETED | OUTPATIENT
Start: 2024-08-26 | End: 2024-08-26

## 2024-08-26 RX ORDER — OXYCODONE HYDROCHLORIDE 5 MG/1
5 TABLET ORAL EVERY 4 HOURS PRN
Status: DISCONTINUED | OUTPATIENT
Start: 2024-08-26 | End: 2024-08-27 | Stop reason: HOSPADM

## 2024-08-26 RX ORDER — FENTANYL CITRATE 50 UG/ML
INJECTION, SOLUTION INTRAMUSCULAR; INTRAVENOUS PRN
Status: DISCONTINUED | OUTPATIENT
Start: 2024-08-26 | End: 2024-08-26

## 2024-08-26 RX ORDER — ONDANSETRON 4 MG/1
4 TABLET, ORALLY DISINTEGRATING ORAL EVERY 30 MIN PRN
Status: DISCONTINUED | OUTPATIENT
Start: 2024-08-26 | End: 2024-08-26 | Stop reason: HOSPADM

## 2024-08-26 RX ORDER — AMOXICILLIN 250 MG
1 CAPSULE ORAL 2 TIMES DAILY
Status: DISCONTINUED | OUTPATIENT
Start: 2024-08-26 | End: 2024-08-27 | Stop reason: HOSPADM

## 2024-08-26 RX ORDER — NALOXONE HYDROCHLORIDE 0.4 MG/ML
0.1 INJECTION, SOLUTION INTRAMUSCULAR; INTRAVENOUS; SUBCUTANEOUS
Status: DISCONTINUED | OUTPATIENT
Start: 2024-08-26 | End: 2024-08-26 | Stop reason: HOSPADM

## 2024-08-26 RX ORDER — SODIUM CHLORIDE 9 MG/ML
INJECTION, SOLUTION INTRAVENOUS CONTINUOUS
Status: DISCONTINUED | OUTPATIENT
Start: 2024-08-26 | End: 2024-08-27 | Stop reason: HOSPADM

## 2024-08-26 RX ORDER — POLYETHYLENE GLYCOL 3350 17 G/17G
17 POWDER, FOR SOLUTION ORAL DAILY
Status: DISCONTINUED | OUTPATIENT
Start: 2024-08-27 | End: 2024-08-27 | Stop reason: HOSPADM

## 2024-08-26 RX ORDER — BUPIVACAINE HYDROCHLORIDE AND EPINEPHRINE 2.5; 5 MG/ML; UG/ML
INJECTION, SOLUTION EPIDURAL; INFILTRATION; INTRACAUDAL; PERINEURAL
Status: DISCONTINUED
Start: 2024-08-26 | End: 2024-08-26 | Stop reason: HOSPADM

## 2024-08-26 RX ORDER — LIDOCAINE 40 MG/G
CREAM TOPICAL
Status: DISCONTINUED | OUTPATIENT
Start: 2024-08-26 | End: 2024-08-26 | Stop reason: HOSPADM

## 2024-08-26 RX ORDER — BISACODYL 10 MG
10 SUPPOSITORY, RECTAL RECTAL DAILY PRN
Status: DISCONTINUED | OUTPATIENT
Start: 2024-08-29 | End: 2024-08-27 | Stop reason: HOSPADM

## 2024-08-26 RX ORDER — GLYCOPYRROLATE 0.2 MG/ML
INJECTION, SOLUTION INTRAMUSCULAR; INTRAVENOUS PRN
Status: DISCONTINUED | OUTPATIENT
Start: 2024-08-26 | End: 2024-08-26

## 2024-08-26 RX ORDER — HYDROMORPHONE HCL IN WATER/PF 6 MG/30 ML
0.2 PATIENT CONTROLLED ANALGESIA SYRINGE INTRAVENOUS
Status: DISCONTINUED | OUTPATIENT
Start: 2024-08-26 | End: 2024-08-27 | Stop reason: HOSPADM

## 2024-08-26 RX ORDER — FENTANYL CITRATE 50 UG/ML
50 INJECTION, SOLUTION INTRAMUSCULAR; INTRAVENOUS EVERY 5 MIN PRN
Status: DISCONTINUED | OUTPATIENT
Start: 2024-08-26 | End: 2024-08-26 | Stop reason: HOSPADM

## 2024-08-26 RX ORDER — DIAZEPAM 5 MG
5-10 TABLET ORAL
Status: ON HOLD | COMMUNITY
End: 2024-08-27

## 2024-08-26 RX ORDER — ACETAMINOPHEN 500 MG
1000 TABLET ORAL EVERY 6 HOURS PRN
COMMUNITY

## 2024-08-26 RX ORDER — SODIUM CHLORIDE, SODIUM LACTATE, POTASSIUM CHLORIDE, CALCIUM CHLORIDE 600; 310; 30; 20 MG/100ML; MG/100ML; MG/100ML; MG/100ML
INJECTION, SOLUTION INTRAVENOUS CONTINUOUS
Status: DISCONTINUED | OUTPATIENT
Start: 2024-08-26 | End: 2024-08-26 | Stop reason: HOSPADM

## 2024-08-26 RX ORDER — ACETAMINOPHEN 325 MG/1
975 TABLET ORAL ONCE
Status: COMPLETED | OUTPATIENT
Start: 2024-08-26 | End: 2024-08-26

## 2024-08-26 RX ORDER — HYDROMORPHONE HCL IN WATER/PF 6 MG/30 ML
0.2 PATIENT CONTROLLED ANALGESIA SYRINGE INTRAVENOUS EVERY 5 MIN PRN
Status: DISCONTINUED | OUTPATIENT
Start: 2024-08-26 | End: 2024-08-26 | Stop reason: HOSPADM

## 2024-08-26 RX ORDER — MULTIPLE VITAMINS W/ MINERALS TAB 9MG-400MCG
1 TAB ORAL DAILY
COMMUNITY

## 2024-08-26 RX ORDER — PROCHLORPERAZINE MALEATE 10 MG
10 TABLET ORAL EVERY 6 HOURS PRN
Status: DISCONTINUED | OUTPATIENT
Start: 2024-08-26 | End: 2024-08-27 | Stop reason: HOSPADM

## 2024-08-26 RX ORDER — HYDROMORPHONE HCL IN WATER/PF 6 MG/30 ML
0.4 PATIENT CONTROLLED ANALGESIA SYRINGE INTRAVENOUS
Status: DISCONTINUED | OUTPATIENT
Start: 2024-08-26 | End: 2024-08-27 | Stop reason: HOSPADM

## 2024-08-26 RX ORDER — METHOCARBAMOL 500 MG/1
500 TABLET, FILM COATED ORAL 4 TIMES DAILY PRN
Status: DISCONTINUED | OUTPATIENT
Start: 2024-08-26 | End: 2024-08-27 | Stop reason: HOSPADM

## 2024-08-26 RX ORDER — ONDANSETRON 2 MG/ML
4 INJECTION INTRAMUSCULAR; INTRAVENOUS EVERY 30 MIN PRN
Status: DISCONTINUED | OUTPATIENT
Start: 2024-08-26 | End: 2024-08-26 | Stop reason: HOSPADM

## 2024-08-26 RX ORDER — METHOCARBAMOL 500 MG/1
500 TABLET, FILM COATED ORAL 4 TIMES DAILY PRN
COMMUNITY

## 2024-08-26 RX ORDER — ACETAMINOPHEN 325 MG/1
975 TABLET ORAL EVERY 8 HOURS
Status: DISCONTINUED | OUTPATIENT
Start: 2024-08-26 | End: 2024-08-27 | Stop reason: HOSPADM

## 2024-08-26 RX ORDER — CEFAZOLIN SODIUM 1 G/3ML
1 INJECTION, POWDER, FOR SOLUTION INTRAMUSCULAR; INTRAVENOUS EVERY 8 HOURS
Status: COMPLETED | OUTPATIENT
Start: 2024-08-26 | End: 2024-08-27

## 2024-08-26 RX ORDER — DEXAMETHASONE SODIUM PHOSPHATE 4 MG/ML
4 INJECTION, SOLUTION INTRA-ARTICULAR; INTRALESIONAL; INTRAMUSCULAR; INTRAVENOUS; SOFT TISSUE
Status: DISCONTINUED | OUTPATIENT
Start: 2024-08-26 | End: 2024-08-26 | Stop reason: HOSPADM

## 2024-08-26 RX ORDER — ONDANSETRON 2 MG/ML
INJECTION INTRAMUSCULAR; INTRAVENOUS PRN
Status: DISCONTINUED | OUTPATIENT
Start: 2024-08-26 | End: 2024-08-26

## 2024-08-26 RX ORDER — FENTANYL CITRATE 50 UG/ML
25 INJECTION, SOLUTION INTRAMUSCULAR; INTRAVENOUS EVERY 5 MIN PRN
Status: DISCONTINUED | OUTPATIENT
Start: 2024-08-26 | End: 2024-08-26 | Stop reason: HOSPADM

## 2024-08-26 RX ORDER — HYDROMORPHONE HCL IN WATER/PF 6 MG/30 ML
0.4 PATIENT CONTROLLED ANALGESIA SYRINGE INTRAVENOUS EVERY 5 MIN PRN
Status: DISCONTINUED | OUTPATIENT
Start: 2024-08-26 | End: 2024-08-26 | Stop reason: HOSPADM

## 2024-08-26 RX ORDER — LORATADINE 10 MG/1
10 TABLET ORAL DAILY PRN
Status: DISCONTINUED | OUTPATIENT
Start: 2024-08-26 | End: 2024-08-27 | Stop reason: HOSPADM

## 2024-08-26 RX ADMIN — FENTANYL CITRATE 50 MCG: 50 INJECTION, SOLUTION INTRAMUSCULAR; INTRAVENOUS at 14:36

## 2024-08-26 RX ADMIN — ACETAMINOPHEN 975 MG: 325 TABLET ORAL at 17:32

## 2024-08-26 RX ADMIN — DEXAMETHASONE SODIUM PHOSPHATE 10 MG: 10 INJECTION, SOLUTION INTRAMUSCULAR; INTRAVENOUS at 11:14

## 2024-08-26 RX ADMIN — ROCURONIUM BROMIDE 50 MG: 10 INJECTION, SOLUTION INTRAVENOUS at 11:14

## 2024-08-26 RX ADMIN — SUGAMMADEX 50 MG: 100 INJECTION, SOLUTION INTRAVENOUS at 13:40

## 2024-08-26 RX ADMIN — SODIUM CHLORIDE: 9 INJECTION, SOLUTION INTRAVENOUS at 17:32

## 2024-08-26 RX ADMIN — CEFAZOLIN 1 G: 1 INJECTION, POWDER, FOR SOLUTION INTRAMUSCULAR; INTRAVENOUS at 17:32

## 2024-08-26 RX ADMIN — ROCURONIUM BROMIDE 20 MG: 10 INJECTION, SOLUTION INTRAVENOUS at 13:00

## 2024-08-26 RX ADMIN — Medication 2 G: at 11:03

## 2024-08-26 RX ADMIN — PHENYLEPHRINE HYDROCHLORIDE 100 MCG: 10 INJECTION INTRAVENOUS at 13:27

## 2024-08-26 RX ADMIN — SODIUM CHLORIDE, POTASSIUM CHLORIDE, SODIUM LACTATE AND CALCIUM CHLORIDE: 600; 310; 30; 20 INJECTION, SOLUTION INTRAVENOUS at 13:29

## 2024-08-26 RX ADMIN — DEXMEDETOMIDINE HYDROCHLORIDE 12 MCG: 100 INJECTION, SOLUTION INTRAVENOUS at 11:03

## 2024-08-26 RX ADMIN — GLYCOPYRROLATE 0.2 MG: 0.2 INJECTION INTRAMUSCULAR; INTRAVENOUS at 13:28

## 2024-08-26 RX ADMIN — GLYCOPYRROLATE 0.2 MG: 0.2 INJECTION INTRAMUSCULAR; INTRAVENOUS at 12:03

## 2024-08-26 RX ADMIN — OXYCODONE HYDROCHLORIDE 10 MG: 5 TABLET ORAL at 17:33

## 2024-08-26 RX ADMIN — LIDOCAINE HYDROCHLORIDE 5 ML: 10 INJECTION, SOLUTION INFILTRATION; PERINEURAL at 11:14

## 2024-08-26 RX ADMIN — SUGAMMADEX 150 MG: 100 INJECTION, SOLUTION INTRAVENOUS at 13:33

## 2024-08-26 RX ADMIN — SODIUM CHLORIDE, POTASSIUM CHLORIDE, SODIUM LACTATE AND CALCIUM CHLORIDE: 600; 310; 30; 20 INJECTION, SOLUTION INTRAVENOUS at 09:41

## 2024-08-26 RX ADMIN — DEXMEDETOMIDINE HYDROCHLORIDE 8 MCG: 100 INJECTION, SOLUTION INTRAVENOUS at 12:01

## 2024-08-26 RX ADMIN — MIDAZOLAM 2 MG: 1 INJECTION INTRAMUSCULAR; INTRAVENOUS at 11:03

## 2024-08-26 RX ADMIN — HYDROMORPHONE HYDROCHLORIDE 0.5 MG: 1 INJECTION, SOLUTION INTRAMUSCULAR; INTRAVENOUS; SUBCUTANEOUS at 12:04

## 2024-08-26 RX ADMIN — PROPOFOL 120 MG: 10 INJECTION, EMULSION INTRAVENOUS at 11:14

## 2024-08-26 RX ADMIN — FENTANYL CITRATE 50 MCG: 50 INJECTION INTRAMUSCULAR; INTRAVENOUS at 11:14

## 2024-08-26 RX ADMIN — METHOCARBAMOL 500 MG: 500 TABLET ORAL at 15:11

## 2024-08-26 RX ADMIN — FENTANYL CITRATE 25 MCG: 50 INJECTION, SOLUTION INTRAMUSCULAR; INTRAVENOUS at 15:23

## 2024-08-26 RX ADMIN — PROPOFOL 40 MG: 10 INJECTION, EMULSION INTRAVENOUS at 13:00

## 2024-08-26 RX ADMIN — ONDANSETRON 4 MG: 2 INJECTION INTRAMUSCULAR; INTRAVENOUS at 13:23

## 2024-08-26 RX ADMIN — APREPITANT 40 MG: 40 CAPSULE ORAL at 10:10

## 2024-08-26 RX ADMIN — SCOPOLAMINE 1 PATCH: 1 SYSTEM TRANSDERMAL at 10:10

## 2024-08-26 RX ADMIN — SENNOSIDES AND DOCUSATE SODIUM 1 TABLET: 50; 8.6 TABLET ORAL at 20:52

## 2024-08-26 RX ADMIN — PROPOFOL 200 MCG/KG/MIN: 10 INJECTION, EMULSION INTRAVENOUS at 11:14

## 2024-08-26 RX ADMIN — FENTANYL CITRATE 50 MCG: 50 INJECTION INTRAMUSCULAR; INTRAVENOUS at 11:41

## 2024-08-26 RX ADMIN — PHENYLEPHRINE HYDROCHLORIDE 0.3 MCG/KG/MIN: 10 INJECTION INTRAVENOUS at 11:26

## 2024-08-26 RX ADMIN — ACETAMINOPHEN 975 MG: 325 TABLET ORAL at 09:40

## 2024-08-26 ASSESSMENT — ACTIVITIES OF DAILY LIVING (ADL)
ADLS_ACUITY_SCORE: 28
ADLS_ACUITY_SCORE: 26
ADLS_ACUITY_SCORE: 27
ADLS_ACUITY_SCORE: 29
ADLS_ACUITY_SCORE: 26
ADLS_ACUITY_SCORE: 22
ADLS_ACUITY_SCORE: 29
ADLS_ACUITY_SCORE: 29
ADLS_ACUITY_SCORE: 27
ADLS_ACUITY_SCORE: 29
ADLS_ACUITY_SCORE: 26
ADLS_ACUITY_SCORE: 27
ADLS_ACUITY_SCORE: 26
ADLS_ACUITY_SCORE: 26
ADLS_ACUITY_SCORE: 29

## 2024-08-26 ASSESSMENT — ENCOUNTER SYMPTOMS: SEIZURES: 1
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
## 2024-08-26 NOTE — ANESTHESIA POSTPROCEDURE EVALUATION
Patient: Areli Nj    Procedure: Procedure(s):  Cervical 6 - Thoracic 1 Anterior Cervical Discectomy and Fusion, possible Cervical 7 corpectomy       Anesthesia Type:  General    Note:  Disposition: Inpatient   Postop Pain Control: Uneventful            Sign Out: Well controlled pain   PONV: No   Neuro/Psych: Uneventful            Sign Out: Acceptable/Baseline neuro status   Airway/Respiratory: Uneventful            Sign Out: Acceptable/Baseline resp. status   CV/Hemodynamics: Uneventful            Sign Out: Acceptable CV status; No obvious hypovolemia; No obvious fluid overload   Other NRE: NONE   DID A NON-ROUTINE EVENT OCCUR? No           Last vitals:  Vitals Value Taken Time   /70 08/26/24 1530   Temp 36.5  C (97.7  F) 08/26/24 1357   Pulse 54 08/26/24 1531   Resp 13 08/26/24 1530   SpO2 99 % 08/26/24 1531   Vitals shown include unfiled device data.    Electronically Signed By: Clinton Howell MD  August 26, 2024  3:34 PM

## 2024-08-26 NOTE — ANESTHESIA PREPROCEDURE EVALUATION
Anesthesia Pre-Procedure Evaluation    Patient: Areli Nj   MRN: [id number] : 1985        Procedure : Procedure(s):  Cervical 6 - Thoracic 1 Anterior Cervical Discectomy and Fusion, possible Cervical 7 corpectomy          Past Medical History:   Diagnosis Date    Anxiety     Asthma     Cerebral palsy (H)     Depressive disorder     Fibromyalgia     Gastro-oesophageal reflux disease     History of cholestasis during pregnancy     Other chronic pain     PONV (postoperative nausea and vomiting)     Seizures (H)       Past Surgical History:   Procedure Laterality Date    HIP SURGERY      IR LUMBAR PUNCTURE  2020    IR LUMBAR PUNCTURE  2020    IR LUMBAR PUNCTURE  2019    LUMBAR FUSION      L4-L5    ORTHOPEDIC SURGERY        Allergies   Allergen Reactions    Celebrex [Celecoxib]       Social History     Tobacco Use    Smoking status: Never    Smokeless tobacco: Not on file   Substance Use Topics    Alcohol use: No      Wt Readings from Last 1 Encounters:   24 69.9 kg (154 lb)        Anesthesia Evaluation   Pt has had prior anesthetic.     History of anesthetic complications  - PONV.      ROS/MED HX  ENT/Pulmonary:     (+)                      asthma                  Neurologic: Comment: Cerebral palsy    (+)       seizures, last seizure: 7 Year old,                        Cardiovascular:  - neg cardiovascular ROS     METS/Exercise Tolerance:     Hematologic:  - neg hematologic  ROS     Musculoskeletal:       GI/Hepatic:     (+) GERD,                   Renal/Genitourinary:  - neg Renal ROS     Endo:  - neg endo ROS     Psychiatric/Substance Use:     (+) psychiatric history anxiety and depression       Infectious Disease:       Malignancy:       Other:      (+)  , H/O Chronic Pain,         Physical Exam    Airway  airway exam normal      Mallampati: I   TM distance: > 3 FB   Neck ROM: full   Mouth opening: > 3 cm    Respiratory Devices and Support         Dental       (+) Modest  "Abnormalities - crowns, retainers, 1 or 2 missing teeth      Cardiovascular   cardiovascular exam normal          Pulmonary   pulmonary exam normal                OUTSIDE LABS:  CBC:   Lab Results   Component Value Date    WBC 7.8 11/10/2016    WBC 6.0 08/21/2012    HGB 13.4 11/10/2016    HGB 13.7 08/21/2012    HCT 38.6 11/10/2016    HCT 39.7 08/21/2012     11/10/2016     08/21/2012     BMP:   Lab Results   Component Value Date     11/10/2016     08/21/2012    POTASSIUM 4.6 11/10/2016    POTASSIUM 3.9 08/21/2012    CHLORIDE 105 11/10/2016    CHLORIDE 100 08/21/2012    CO2 23 11/10/2016    CO2 28 08/21/2012    BUN 16 11/10/2016    BUN 10 08/21/2012    CR 0.60 11/10/2016    CR 0.57 08/21/2012    GLC 78 11/10/2016    GLC 92 08/21/2012     COAGS: No results found for: \"PTT\", \"INR\", \"FIBR\"  POC:   Lab Results   Component Value Date    HCG Negative 08/22/2012    HCGS Negative 06/16/2011     HEPATIC:   Lab Results   Component Value Date    ALBUMIN 4.2 08/21/2012    PROTTOTAL 7.1 08/21/2012    ALT 87 (H) 08/21/2012    AST 56 (H) 08/21/2012    ALKPHOS 63 08/21/2012    BILITOTAL 0.5 08/21/2012     OTHER:   Lab Results   Component Value Date    RIAN 9.1 11/10/2016    LIPASE 46 08/21/2012       Anesthesia Plan    ASA Status:  3    NPO Status:  NPO Appropriate    Anesthesia Type: General.     - Airway: ETT   Induction: Intravenous.   Maintenance: TIVA.        Consents    Anesthesia Plan(s) and associated risks, benefits, and realistic alternatives discussed. Questions answered and patient/representative(s) expressed understanding.     - Discussed:     - Discussed with:  Patient      - Extended Intubation/Ventilatory Support Discussed: No.      - Patient is DNR/DNI Status: No          Postoperative Care    Pain management: Multi-modal analgesia.   PONV prophylaxis: Ondansetron (or other 5HT-3), Dexamethasone or Solumedrol     Comments:               Yessy Hendrickson MD    I have reviewed the pertinent " "notes and labs in the chart from the past 30 days and (re)examined the patient.  Any updates or changes from those notes are reflected in this note.              # Overweight: Estimated body mass index is 29.1 kg/m  as calculated from the following:    Height as of this encounter: 1.549 m (5' 1\").    Weight as of this encounter: 69.9 kg (154 lb).      "

## 2024-08-26 NOTE — PLAN OF CARE
Problem: Adult Inpatient Plan of Care  Goal: Absence of Hospital-Acquired Illness or Injury  Intervention: Prevent Skin Injury  Recent Flowsheet Documentation  Taken 8/26/2024 1700 by Brigido iMchele RN  Body Position:   neutral head position   supine, head elevated     Problem: Adult Inpatient Plan of Care  Goal: Absence of Hospital-Acquired Illness or Injury  Intervention: Prevent and Manage VTE (Venous Thromboembolism) Risk  Recent Flowsheet Documentation  Taken 8/26/2024 1700 by Brigido Michele RN  VTE Prevention/Management: SCDs on (sequential compression devices)   Goal Outcome Evaluation:      Plan of Care Reviewed With: patient    Overall Patient Progress: improvingOverall Patient Progress: improving  Patient vital signs are at baseline: Yes  Patient able to ambulate as they were prior to admission or with assist devices provided by therapies during their stay:  No,  Reason:  pt uses a wheel chair at baseline  Patient MUST void prior to discharge:  No,  Reason:  due to void  Patient able to tolerate oral intake:  Yes  Pain has adequate pain control using Oral analgesics:  Yes  Does patient have an identified :  Yes  Has goal D/C date and time been discussed with patient:  Yes   Pt alert and oriented, vss on 2 liters of oxygen, pain minimal, has passed gas, on can advance on regular diet, passed the 3 pvr, potential discharge today

## 2024-08-26 NOTE — PHARMACY-ADMISSION MEDICATION HISTORY
Pharmacist Admission Medication History    Admission medication history is complete. The information provided in this note is only as accurate as the sources available at the time of the update.    Information Source(s): Patient and CareEverywhere/SureScripts via in-person    Pertinent Information:       Allergies reviewed with patient and updates made in EHR: no    Medication History Completed By: Preeti Schuster RPH 8/26/2024 10:39 AM    PTA Med List   Medication Sig Last Dose    acetaminophen (TYLENOL) 500 MG tablet Take 1,000 mg by mouth every 6 hours as needed for mild pain. 8/25/2024 at PM    diazepam (VALIUM) 5 MG tablet Take 5-10 mg by mouth nightly as needed for anxiety. 8/24/2024    medical cannabis (Patient's own supply) 1 Dose by Other route See Admin Instructions. (The purpose of this order is to document that the patient reports taking medical cannabis.  This is not a prescription, and is not used to certify that the patient has a qualifying medical condition.) 8/26/2024    methocarbamol (ROBAXIN) 500 MG tablet Take 500 mg by mouth 4 times daily as needed for muscle spasms. Unknown    multivitamin w/minerals (THERA-VIT-M) tablet Take 1 tablet by mouth daily. 8/19/2024    ondansetron (ZOFRAN ODT) 4 MG ODT tab Place 1 tablet (4 mg) under the tongue every 6 hours as needed for nausea not recent    Vitamin D3 (CHOLECALCIFEROL) 25 mcg (1000 units) tablet Take 25 mcg by mouth daily. 8/19/2024

## 2024-08-26 NOTE — ANESTHESIA CARE TRANSFER NOTE
Patient: Areli Nj    Procedure: Procedure(s):  Cervical 6 - Thoracic 1 Anterior Cervical Discectomy and Fusion, possible Cervical 7 corpectomy       Diagnosis: Cervical radiculopathy [M54.12]  Diagnosis Additional Information: No value filed.    Anesthesia Type:   General     Note:    Oropharynx: oropharynx clear of all foreign objects and spontaneously breathing  Level of Consciousness: drowsy  Oxygen Supplementation: face mask  Level of Supplemental Oxygen (L/min / FiO2): 10  Independent Airway: airway patency satisfactory and stable  Dentition: dentition unchanged  Vital Signs Stable: post-procedure vital signs reviewed and stable  Report to RN Given: handoff report given  Patient transferred to: PACU    Handoff Report: Identifed the Patient, Identified the Reponsible Provider, Reviewed the pertinent medical history, Discussed the surgical course, Reviewed Intra-OP anesthesia mangement and issues during anesthesia, Set expectations for post-procedure period and Allowed opportunity for questions and acknowledgement of understanding      Vitals:  Vitals Value Taken Time   /75 08/26/24 1400   Temp 36.5  C (97.7  F) 08/26/24 1357   Pulse 94 08/26/24 1402   Resp 17 08/26/24 1402   SpO2 99 % 08/26/24 1402   Vitals shown include unfiled device data.    Electronically Signed By: KADEEM Kline CRNA  August 26, 2024  2:04 PM

## 2024-08-26 NOTE — OP NOTE
Orthopedic  Operative Note    Pre-operative diagnosis: 1.  Cervical radiculopathy in the setting of multilevel cervical foraminal stenosis    Post-operative diagnosis: 1.  Cervical radiculopathy in the setting of multilevel cervical foraminal stenosis    Procedure: 1.  C6-7 ACDF, ATEK   2.  C7-T1 ACDF, ATEK   3.  C6-T1 anterior cervical plate application   4.  Local autograft, catalyst bone grafting   5.  Use of intraoperative microscopy    Surgeon: Doug Best MD    Assistant(s): Beth Espitia PA-C is an experienced first surgical assistant whose assistance was necessary for patient positioning, hemostasis, soft tissue and neural retraction, closure, and safe progression of surgery.      Anesthesia: General endotracheal anesthesia    Estimated blood loss: 20 mL     Drains: None    Specimens: None    Indications:                               The patient is a 39-year-old female developed intractable upper extremity radicular symptoms in the setting of severe multilevel foraminal stenosis.  She failed conservative measures and elected for surgical intervention in the form of a two-level ACDF.  She strongly desired to maintain maximal mobility, so we elected to omit the C5-6 segment, as she did not have clear evidence of a C6 dermatome radiculopathy.    I again reviewed the operative indications, the general and specific risks, benefits, and rehabilitation issues. Details of the procedure and postoperative recovery is highlighted. Patient and attending family appear to understand the issues and express their consent proceed.     Findings: Severe disc height collapse and foraminal stenosis    Complications: None     Procedure Detail: The patient was evaluated in the preoperative holding area.  The patient was given the opportunity ask questions regarding the procedure in detail, including the risks, benefits, and alternatives.  The patient provided informed consent to proceed.  The patient's right sided neck  was marked with ink in the surgeon's initials.  The patient was transported back to the operative suite on a gurney.  There, the patient was inducted under general anesthesia by our anesthesia colleagues after being transferred to a regular OR table.  The arms were papoosed with ample padding of the ulnar nerve.  An axillary roll was placed underneath the patient's shoulder blades, and the patient's head was rested on a foam doughnut.  Care was taken not to over extend the neck.  The neck was then prepped and draped in the usual sterile fashion.  A preprocedural pause was performed to identify the correct patient, procedure to be performed, and administration of preoperative antibiotics.     The procedure was then initiated with a transverse incision along neck crease on the right side of the neck at the anticipated level of the procedure.  Subcutaneous tissues were divided using a Bovie, and the platysma was divided in line with the incision with a Bovie.  I then undermined the platysma both superiorly and inferiorly with the use of a Bovie and an Adson.  I then performed a standard King-Hopper approach on the right, exploring the interval between the carotid sheath on one side and the trachea and esophagus on the other side.  Once I was down to the pretracheal fascia, I moved the esophagus to the side using a Cloward, and used Kitners to thin the fascia and expose the ALL.  I used a curved clamp on the anticipated level of the operation, and obtained a crosstable lateral to confirm that we were in fact at the correct level.  Crosstable lateral confirmed that we were at the C6-7 interval.  Once the level had been confirmed, I cleaned the soft tissues off of the anterior spine using a Bovie.  I carefully mobilized the longus coli bilaterally so that they would fit underneath the trimline retractor.  Once the soft tissues and longus coli had been appropriately exposed and mobilized, I placed a trimline retractor.   The operative microscope was then brought in for better visualization.  I placed Dover pins measuring 12 mm in the vertebral bodies of C7 and T1, and began the procedure at that level.  I first used a Bovie to perform an annulotomy, and then used a combination of high-speed bur, straight, and up-biting curettes to remove the disc material.  Once I was down to PLL, I used a to be reverse angled 2B curette to divide the PLL at the level of the foramen.  I then excised the PLL using a combination of #1 and #2 Kerrisons.  Once uncovertebral osteophytes were removed, I interrogated the foramen bilaterally with a blunt nerve hook, and found them to be widely patent.  Hemostasis was obtained using combination of Surgi-Nhan and bipolar electrocautery.  I then trialed screw sizes, and found size 16 to be appropriate for this patient.  I then trialed implant sizes, and found a 6 mm large footprint trial to be appropriate for this patient.  I then ensured that all cartilaginous material was removed from the endplates, and carefully brushed the endplates with a high-speed bur.  I then placed the final implant. packed with local autograft and catalyst.     Attention was then turned to the C6-7 segment, which was prepared in a similar fashion.  A 6 mm large footprint was selected for this level.  Once this had been completed, the Dover pins were removed and their holes were packed with bone wax.  I then placed a 36 mm plate, and secured it in place with screws. I locked each screw in place.  A final AP and lateral was obtained, which confirmed appropriate positioning and levels of the implants.  There was no bleeding, so I elected not to place a drain. The wound was copiously irrigated.  I obtained final hemostasis using bipolar electrocautery and Floseal.  I inspected the esophagus and found no obvious signs of trauma.  The platysma was closed with a running 3-0 PDS.  The skin was closed with 4-0 Monocryl, and Steri-Strips were  placed over the wound.  All sponge and needle counts were correct at the end of the procedure.  The patient tolerated procedure without complication.            Condition: Stable     Weight bearing status: Weight bearing as tolerated     Activity:      Anticoagulation plan:    Plan:      Doug Best MD  [facility]  Date:  8/26/2024  1:28 PM   Activity as tolerated  Patient may move about with assist as indicated or with supervision    Ambulation and mechanical prophylaxis.    The patient will be transferred to the floor once they clear PACU criteria.  Ancef will be given postoperatively for antibiotic prophylaxis, and the patient will mobilize for DVT prophylaxis.  The patient will be on strict no heavy lifting, twisting, or bending requirements for the next 3 months.  The patient will follow-up with me in 2 weeks for a follow-up visit.

## 2024-08-26 NOTE — ANESTHESIA PROCEDURE NOTES
Airway       Patient location during procedure: OR       Procedure Start/Stop Times: 8/26/2024 11:18 AM  Staff -        Anesthesiologist:  Yessy Hendrickson MD       CRNA: Theo Kauffman APRN CRNA       Performed By: CRNA  Consent for Airway        Urgency: elective  Indications and Patient Condition       Indications for airway management: mango-procedural       Induction type:intravenous       Mask difficulty assessment: 1 - vent by mask    Final Airway Details       Final airway type: endotracheal airway       Successful airway: ETT - single and Oral  Endotracheal Airway Details        ETT size (mm): 7.0       Cuffed: yes       Cuff volume (mL): 8       Successful intubation technique: video laryngoscopy       VL Blade Size: Glidescope 3       Grade View of Cords: 1       Adjucts: stylet       Position: Right       Measured from: gums/teeth       Secured at (cm): 21       Bite block used: None    Post intubation assessment        Placement verified by: capnometry, equal breath sounds and chest rise        Number of attempts at approach: 1       Number of other approaches attempted: 0       Secured with: tape       Ease of procedure: easy       Dentition: Intact and Unchanged    Medication(s) Administered   Medication Administration Time: 8/26/2024 11:18 AM

## 2024-08-26 NOTE — OR NURSING
Informed Dr. Howell of patient's heart rate variability. Pt remains sinus rhythm, however alternates between bradycardia mid 40s and a normal rate in the mid 70s. Per provider no additional interventions needed at this time.

## 2024-08-26 NOTE — INTERVAL H&P NOTE
"I have reviewed the surgical (or preoperative) H&P that is linked to this encounter, and examined the patient. There are no significant changes    Clinical Conditions Present on Arrival:  Clinically Significant Risk Factors Present on Admission                      # Overweight: Estimated body mass index is 29.1 kg/m  as calculated from the following:    Height as of this encounter: 1.549 m (5' 1\").    Weight as of this encounter: 69.9 kg (154 lb).       "

## 2024-08-27 ENCOUNTER — APPOINTMENT (OUTPATIENT)
Dept: OCCUPATIONAL THERAPY | Facility: CLINIC | Age: 39
End: 2024-08-27
Attending: ORTHOPAEDIC SURGERY
Payer: MEDICARE

## 2024-08-27 ENCOUNTER — APPOINTMENT (OUTPATIENT)
Dept: RADIOLOGY | Facility: CLINIC | Age: 39
End: 2024-08-27
Attending: ORTHOPAEDIC SURGERY
Payer: MEDICARE

## 2024-08-27 ENCOUNTER — APPOINTMENT (OUTPATIENT)
Dept: PHYSICAL THERAPY | Facility: CLINIC | Age: 39
End: 2024-08-27
Attending: ORTHOPAEDIC SURGERY
Payer: MEDICARE

## 2024-08-27 VITALS
RESPIRATION RATE: 14 BRPM | TEMPERATURE: 98.2 F | DIASTOLIC BLOOD PRESSURE: 88 MMHG | BODY MASS INDEX: 29.07 KG/M2 | SYSTOLIC BLOOD PRESSURE: 129 MMHG | HEART RATE: 46 BPM | HEIGHT: 61 IN | WEIGHT: 154 LBS | OXYGEN SATURATION: 96 %

## 2024-08-27 LAB
ANION GAP SERPL CALCULATED.3IONS-SCNC: 9 MMOL/L (ref 7–15)
BUN SERPL-MCNC: 7.1 MG/DL (ref 6–20)
CALCIUM SERPL-MCNC: 8.6 MG/DL (ref 8.8–10.4)
CHLORIDE SERPL-SCNC: 105 MMOL/L (ref 98–107)
CREAT SERPL-MCNC: 0.59 MG/DL (ref 0.51–0.95)
EGFRCR SERPLBLD CKD-EPI 2021: >90 ML/MIN/1.73M2
ERYTHROCYTE [DISTWIDTH] IN BLOOD BY AUTOMATED COUNT: 12.4 % (ref 10–15)
GLUCOSE BLDC GLUCOMTR-MCNC: 101 MG/DL (ref 70–99)
GLUCOSE SERPL-MCNC: 95 MG/DL (ref 70–99)
HCO3 SERPL-SCNC: 24 MMOL/L (ref 22–29)
HCT VFR BLD AUTO: 35.6 % (ref 35–47)
HGB BLD-MCNC: 11.9 G/DL (ref 11.7–15.7)
MCH RBC QN AUTO: 30.4 PG (ref 26.5–33)
MCHC RBC AUTO-ENTMCNC: 33.4 G/DL (ref 31.5–36.5)
MCV RBC AUTO: 91 FL (ref 78–100)
PLATELET # BLD AUTO: 180 10E3/UL (ref 150–450)
POTASSIUM SERPL-SCNC: 3.8 MMOL/L (ref 3.4–5.3)
RBC # BLD AUTO: 3.91 10E6/UL (ref 3.8–5.2)
SODIUM SERPL-SCNC: 138 MMOL/L (ref 135–145)
WBC # BLD AUTO: 9.8 10E3/UL (ref 4–11)

## 2024-08-27 PROCEDURE — 80048 BASIC METABOLIC PNL TOTAL CA: CPT | Performed by: ORTHOPAEDIC SURGERY

## 2024-08-27 PROCEDURE — 36415 COLL VENOUS BLD VENIPUNCTURE: CPT | Performed by: ORTHOPAEDIC SURGERY

## 2024-08-27 PROCEDURE — 250N000011 HC RX IP 250 OP 636: Performed by: ORTHOPAEDIC SURGERY

## 2024-08-27 PROCEDURE — 97162 PT EVAL MOD COMPLEX 30 MIN: CPT | Mod: GP

## 2024-08-27 PROCEDURE — 97166 OT EVAL MOD COMPLEX 45 MIN: CPT | Mod: GO

## 2024-08-27 PROCEDURE — 97116 GAIT TRAINING THERAPY: CPT | Mod: GP

## 2024-08-27 PROCEDURE — 99214 OFFICE O/P EST MOD 30 MIN: CPT | Performed by: HOSPITALIST

## 2024-08-27 PROCEDURE — 250N000013 HC RX MED GY IP 250 OP 250 PS 637: Performed by: ORTHOPAEDIC SURGERY

## 2024-08-27 PROCEDURE — 82962 GLUCOSE BLOOD TEST: CPT

## 2024-08-27 PROCEDURE — 85014 HEMATOCRIT: CPT | Performed by: ORTHOPAEDIC SURGERY

## 2024-08-27 PROCEDURE — 250N000013 HC RX MED GY IP 250 OP 250 PS 637: Performed by: HOSPITALIST

## 2024-08-27 PROCEDURE — 97535 SELF CARE MNGMENT TRAINING: CPT | Mod: GO

## 2024-08-27 PROCEDURE — 999N000065 XR CERVICAL SPINE 2/3 VIEWS

## 2024-08-27 RX ORDER — ALBUTEROL SULFATE 90 UG/1
2 AEROSOL, METERED RESPIRATORY (INHALATION) EVERY 6 HOURS PRN
Qty: 18 G | Refills: 0 | Status: SHIPPED | OUTPATIENT
Start: 2024-08-27

## 2024-08-27 RX ORDER — AMOXICILLIN 250 MG
1 CAPSULE ORAL 2 TIMES DAILY
Qty: 10 TABLET | Refills: 0 | Status: SHIPPED | OUTPATIENT
Start: 2024-08-27

## 2024-08-27 RX ORDER — OXYCODONE HYDROCHLORIDE 5 MG/1
5 TABLET ORAL EVERY 4 HOURS PRN
Qty: 30 TABLET | Refills: 0 | Status: SHIPPED | OUTPATIENT
Start: 2024-08-27

## 2024-08-27 RX ORDER — ALBUTEROL SULFATE 90 UG/1
2 AEROSOL, METERED RESPIRATORY (INHALATION) EVERY 6 HOURS PRN
Status: DISCONTINUED | OUTPATIENT
Start: 2024-08-27 | End: 2024-08-27 | Stop reason: HOSPADM

## 2024-08-27 RX ORDER — DIAZEPAM 5 MG
5-10 TABLET ORAL
COMMUNITY
Start: 2024-08-27

## 2024-08-27 RX ORDER — ONDANSETRON 4 MG/1
4 TABLET, FILM COATED ORAL EVERY 8 HOURS PRN
Qty: 10 TABLET | Refills: 0 | Status: SHIPPED | OUTPATIENT
Start: 2024-08-27

## 2024-08-27 RX ADMIN — ALBUTEROL SULFATE 2 PUFF: 90 AEROSOL, METERED RESPIRATORY (INHALATION) at 10:26

## 2024-08-27 RX ADMIN — OXYCODONE HYDROCHLORIDE 10 MG: 5 TABLET ORAL at 02:07

## 2024-08-27 RX ADMIN — SENNOSIDES AND DOCUSATE SODIUM 1 TABLET: 50; 8.6 TABLET ORAL at 08:18

## 2024-08-27 RX ADMIN — ACETAMINOPHEN 975 MG: 325 TABLET ORAL at 17:34

## 2024-08-27 RX ADMIN — ACETAMINOPHEN 975 MG: 325 TABLET ORAL at 02:07

## 2024-08-27 RX ADMIN — METHOCARBAMOL 500 MG: 500 TABLET ORAL at 17:35

## 2024-08-27 RX ADMIN — OXYCODONE HYDROCHLORIDE 5 MG: 5 TABLET ORAL at 13:53

## 2024-08-27 RX ADMIN — OXYCODONE HYDROCHLORIDE 10 MG: 5 TABLET ORAL at 08:17

## 2024-08-27 RX ADMIN — ACETAMINOPHEN 975 MG: 325 TABLET ORAL at 10:24

## 2024-08-27 RX ADMIN — OXYCODONE HYDROCHLORIDE 10 MG: 5 TABLET ORAL at 17:35

## 2024-08-27 RX ADMIN — CEFAZOLIN 1 G: 1 INJECTION, POWDER, FOR SOLUTION INTRAMUSCULAR; INTRAVENOUS at 02:08

## 2024-08-27 ASSESSMENT — ACTIVITIES OF DAILY LIVING (ADL)
ADLS_ACUITY_SCORE: 29

## 2024-08-27 NOTE — PROGRESS NOTES
Pt alert and oriented, vss on room air pain meds given, for discharge home today, AVS EXPLAINED and all questions answered. Pt given oxycodone robaxin and tylenol for pain control during the ride. Discharge with all her belongings and  picked her up.
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
## 2024-08-27 NOTE — DISCHARGE SUMMARY
[facility] Discharge Summary                                  [facility]     MARBELLA BAKER [id number]   Age: 39 year old  PCP: Stevan Oconnor, [phone] 1985     Date of Admission:  8/26/2024  Date of Discharge::  8/27/2024  Discharge Provider:  KADEEM Bueno CNP    Code status:  Full Code    Admission Information:  Admission Diagnosis:  Cervical radiculopathy [M54.12]    Post-Operative Day: 1 Day Post-Op     Reason for admission:  The patient was admitted for the following:Procedure(s) (LRB):  Cervical 6 - Thoracic 1 Anterior Cervical Discectomy and Fusion (N/A)    Active Problems:    Cervical radiculopathy      Allergies:  Celebrex [celecoxib]    Following the procedure noted above the patient was transferred to the post-op floor and started on:    Therapy:  physical therapy and occupational therapy  Anticoagulation Plan: Mechanical and/or ambulation     Pain Management: scopainmedication: oxycodone and tylenol  Weight bearing status: The patient will be on strict no heavy lifting, twisting, or bending requirements for the next 3 months. Soft collar for comfort      The patient was followed by Orthopedics during the inpatient treatment course:  Complications:  None  Additional consultations:  Hospitalist      Pertinent Labs   Lab Results: personally reviewed.     Recent Labs   Lab Test 08/27/24  0708 11/10/16  1330   WBC 9.8 7.8   HGB 11.9 13.4   HCT 35.6 38.6   MCV 91 88    230    137          Discharge Information:  Condition at discharge: Stable  Discharge destination:  Discharged to home     Medications at discharge:  Current Discharge Medication List        START taking these medications    Details   albuterol (PROAIR HFA/PROVENTIL HFA/VENTOLIN HFA) 108 (90 Base) MCG/ACT inhaler Inhale 2 puffs into the lungs every 6 hours as needed for wheezing, shortness of breath or cough.  Qty: 18 g, Refills: 0    Comments: Pharmacy may dispense brand  covered by insurance (Proair, or proventil or ventolin or generic albuterol inhaler)  Associated Diagnoses: Mild intermittent asthma without complication      benzocaine-menthol (CEPACOL) 15-3.6 MG lozenge Place 1 lozenge inside cheek every hour as needed for sore throat.  Qty: 20 lozenge, Refills: 0    Associated Diagnoses: Cervical radiculopathy      naloxone (NARCAN) 4 MG/0.1ML nasal spray Spray 1 spray (4 mg) into one nostril alternating nostrils as needed for opioid reversal. every 2-3 minutes until assistance arrives  Qty: 2 each, Refills: 0    Associated Diagnoses: Mild intermittent asthma without complication      ondansetron (ZOFRAN) 4 MG tablet Take 1 tablet (4 mg) by mouth every 8 hours as needed for nausea.  Qty: 10 tablet, Refills: 0    Associated Diagnoses: Mild intermittent asthma without complication      oxyCODONE (ROXICODONE) 5 MG tablet Take 1 tablet (5 mg) by mouth every 4 hours as needed for moderate pain.  Qty: 30 tablet, Refills: 0    Associated Diagnoses: Mild intermittent asthma without complication      senna-docusate (SENOKOT-S/PERICOLACE) 8.6-50 MG tablet Take 1 tablet by mouth 2 times daily.  Qty: 10 tablet, Refills: 0    Associated Diagnoses: Cervical radiculopathy           CONTINUE these medications which have CHANGED    Details   diazepam (VALIUM) 5 MG tablet Take 1-2 tablets (5-10 mg) by mouth nightly as needed for anxiety. Do not take with Oxycodone           CONTINUE these medications which have NOT CHANGED    Details   acetaminophen (TYLENOL) 500 MG tablet Take 1,000 mg by mouth every 6 hours as needed for mild pain.      medical cannabis (Patient's own supply) 1 Dose by Other route See Admin Instructions. (The purpose of this order is to document that the patient reports taking medical cannabis.  This is not a prescription, and is not used to certify that the patient has a qualifying medical condition.)      methocarbamol (ROBAXIN) 500 MG tablet Take 500 mg by mouth 4 times  daily as needed for muscle spasms.      multivitamin w/minerals (THERA-VIT-M) tablet Take 1 tablet by mouth daily.      ondansetron (ZOFRAN ODT) 4 MG ODT tab Place 1 tablet (4 mg) under the tongue every 6 hours as needed for nausea  Qty: 10 tablet, Refills: 0      Vitamin D3 (CHOLECALCIFEROL) 25 mcg (1000 units) tablet Take 25 mcg by mouth daily.                        Follow-Up Care:  Patient should be seen in the office in 14 days by the Orthopedic Surgeon/Physician Assistant.  Call [phone] for appointment or questions.    It was my pleasure to take care of the above patient.  KADEEM Bueno CNP

## 2024-08-27 NOTE — PROGRESS NOTES
[facility]  OUTPATIENT PHYSICAL THERAPY EVALUATION  PLAN OF TREATMENT FOR OUTPATIENT REHABILITATION  (COMPLETE FOR INITIAL CLAIMS ONLY)  Patient's Last Name, First Name, M.I.  YOB: 1985  [place]                        Provider's Name  [facility] Medical Record No.  [id number]                             Onset Date:  08/26/24   Start of Care Date:  08/27/24   Type:     _X_PT   ___OT   ___SLP Medical Diagnosis:  Cervical radiculopathy              PT Diagnosis:  impaired functional mobility Visits from SOC:  1     See note for plan of treatment, functional goals and certification details    I CERTIFY THE NEED FOR THESE SERVICES FURNISHED UNDER        THIS PLAN OF TREATMENT AND WHILE UNDER MY CARE     (Physician co-signature of this document indicates review and certification of the therapy plan).                 08/27/24 1045   Appointment Info   Signing Clinician's Name / Credentials (PT) Suzan Mendoza, PT, DPT   Quick Adds   Quick Adds Certification   Living Environment   People in Home significant other;child(fatemeh), dependent   Current Living Arrangements house   Home Accessibility stairs to enter home   Number of Stairs, Main Entrance 3   Stair Railings, Main Entrance railings safe and in good condition   Self-Care   Equipment Currently Used at Home walker, rolling;wheelchair, power   General Information   Onset of Illness/Injury or Date of Surgery 08/26/24   Referring Physician Doug Best MD   Patient/Family Therapy Goals Statement (PT) to get better   Pertinent History of Current Problem (include personal factors and/or comorbidities that impact the POC) Cervical radiculopathy   Existing Precautions/Restrictions spinal   Weight-Bearing Status - LLE weight-bearing as tolerated   Weight-Bearing Status - RLE weight-bearing as tolerated   Bed Mobility   Bed Mobility supine-sit   Supine-Sit [place] (Bed  Mobility) supervision;verbal cues   Comment, (Bed Mobility) SBA with supine to sit transfer. Verbal cues for safety.   Transfers   Transfers sit-stand transfer   Comment, (Transfers) SBA with no AD for sit<>Stand transfers. Verbal cues for safety and safe hand placement.   Sit-Stand Transfer   Sit-Stand [place] (Transfers) supervision;verbal cues   Comment, (Sit-Stand Transfer) SBA with no AD for sit<>stand transfers. Verbal cues for safety and safe hand placement.   Gait/Stairs (Locomotion)   [place] Level (Gait) supervision;verbal cues   Distance in Feet (Gait) 10'   Pattern (Gait) step-through   Deviations/Abnormal Patterns (Gait) ataxic;base of support, wide;parish decreased;gait speed decreased   Maintains Weight-bearing Status (Gait) able to maintain   Negotiation (Stairs) stairs independence;handrail location;number of steps;ascending technique;descending technique   [place] Level (Stairs) supervision;verbal cues   Handrail Location (Stairs) both sides   Number of Steps (Stairs) 4 steps   Ascending Technique (Stairs) step-over-step   Descending Technique (Stairs) step-over-step   Comment, (Gait/Stairs) Pt ambulates with SBA and no AD. Verbal cues for safety and posture. Education on spinal precautions. Pt negotiates 4 steps with bilateral railings and SBA. Verbal cues for safety and safe step technique.   Clinical Impression   Criteria for Skilled Therapeutic Intervention Yes, treatment indicated   PT Diagnosis (PT) impaired functional mobility   Influenced by the following impairments weakness, pain   Functional limitations due to impairments transfers, ambulation, stairs   Clinical Presentation (PT Evaluation Complexity) stable   Clinical Presentation Rationale Pt presents as medically diagnosed   Clinical Decision Making (Complexity) moderate complexity   Planned Therapy Interventions (PT) balance training;bed mobility training;gait training;home exercise program;patient/family  education;ROM (range of motion);stair training;strengthening;stretching;transfer training   Risk & Benefits of therapy have been explained evaluation/treatment results reviewed;care plan/treatment goals reviewed;patient   PT Total Evaluation Time   PT Eval, Moderate Complexity Minutes (01342) 10   Therapy Certification   Start of care date 08/27/24   Certification date from 08/27/24   Certification date to 09/25/24   Medical Diagnosis Cervical radiculopathy   Physical Therapy Goals   PT Frequency One time eval and treatment only   PT Predicted Duration/Target Date for Goal Attainment 08/27/24   PT Goals Transfers;Gait;Stairs   PT: Transfers Supervision/stand-by assist;Sit to/from stand;Goal Met   PT: Gait Supervision/stand-by assist;100 feet;Goal Met   PT: Stairs Supervision/stand-by assist;4 stairs;Rail on both sides;Goal Met   Interventions   Interventions Quick Adds Gait Training;Therapeutic Activity   Therapeutic Activity   Treatment Detail/Skilled Intervention SBA with supine to sit transfers. Verbal cues for safety. SBA with no AD for sit<>Stand transfers. Verbal cues for safety and safe hand placement. Independent with pericares and donning/doffing briefs. Pt sitting up at edge of bed with call light within reach and RN notified.   Gait Training   Gait Training Minutes (69277) 15   Treatment Detail/Skilled Intervention Pt ambulates with SBA and no AD. Verbal cues for safety and posture. Pt tends to reach for railing and furniture if unsteady. Pt reports has mobility devices at home to assist. Pt requires seated rest break due to fatigue. Pt negotiates 4 steps with bilateral railings and SBA. Verbal cues for safety and safe step technique. Verbal cues for spinal precautions. Pt has soft collar on throughout session.   Distance in Feet 100', 100'   [place] Level (Gait Training) stand-by assist   Physical Assistance Level (Gait Training) supervision;verbal cues   Weight Bearing (Gait Training)  weight-bearing as tolerated   Pattern Analysis (Gait Training) swing-to gait   Gait Analysis Deviations decreased parish;decreased step length;increased stride width   Impairments (Gait Analysis/Training) balance impaired;coordination impaired;ROM decreased;strength decreased   Stair Railings present at both sides   Physical Assist/Nonphysical Assist (Stairs) supervision;verbal cues   Level of [place] (Stairs) stand-by assist   PT Discharge Planning   PT Plan discharge PT   PT Discharge Recommendation (DC Rec) (S)  home with assist   PT Rationale for DC Rec Pt reports good home support and good home setup. Pt has power wheelchair and mobility devices at home.   PT Brief overview of current status SBA with transfers and 100 feet   Total Session Time   Timed Code Treatment Minutes 15   Total Session Time (sum of timed and untimed services) 25     Suzan Mendoza, PT, DPT

## 2024-08-27 NOTE — PROGRESS NOTES
Patient vital signs are at baseline: Yes, on RA  Patient able to ambulate as they were prior to admission or with assist devices provided by therapies during their stay:  Yes, SBA  Patient MUST void prior to discharge:  Yes  Patient able to tolerate oral intake:  Yes  Pain has adequate pain control using Oral analgesics:  Yes, rated moderate pain; PRN PO Oxycodone administered.   Does patient have an identified :  Yes  Has goal D/C date and time been discussed with patient:  Yes     A&Ox4. CMS intact. Patient tolerated regular diet. IV removed for discharge. Call light within reach. Pt is waiting for her ride to be discharge.

## 2024-08-27 NOTE — PROGRESS NOTES
08/27/24 0800   Appointment Info   Signing Clinician's Name / Credentials (OT) Michaela Castillo, OTR/L   Quick Adds   Quick Adds Certification   Living Environment   People in Home significant other;child(fatemeh), dependent   Current Living Arrangements house  (Able to stay on one floor.)   Living Environment Comments Tub/shower with shower chair.  Std toilet with vanity on the R side.   Self-Care   Usual Activity Tolerance good   Current Activity Tolerance moderate   Instrumental Activities of Daily Living (IADL)   IADL Comments Pt has PCA  help 22-24 hrs per day.  Assist with IADLs   General Information   Onset of Illness/Injury or Date of Surgery 08/26/24   Referring Physician Dr. Doug Best   Patient/Family Therapy Goal Statement (OT) To return home with help from PCA.   Additional Occupational Profile Info/Pertinent History of Current Problem Adm for C6-T1 Anterior Discectomy/Fusion.  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   Existing Precautions/Restrictions (S)  fall;seizures;spinal   Cognitive Status Examination   Follows Commands WNL   Visual Perception   Visual Impairment/Limitations WNL  (No eye glasses or HA)   Bed Mobility   Bed Mobility supine-sit;sit-supine   Assistive Device (Bed Mobility) other (see comments)  (HOB elevated 30 degrees)   Transfers   Transfers bed-chair transfer;sit-stand transfer;toilet transfer   Transfer Comments Pt has walker available but prefers not to use it around the house.   Transfer Skill: Bed to Chair/Chair to Bed   Bed-Chair [place] (Transfers) supervision;verbal cues   Assistive Device (Bed-Chair Transfers) other (see comments)  (Holding therapist's hand at times.  Encourage use of walker.  Will have PT see Pt for safe gait.)   Sit-Stand Transfer   Sit-Stand [place] (Transfers) supervision;verbal cues   Toilet Transfer   Type (Toilet Transfer) sit-stand;stand-sit   [place]  Level (Toilet Transfer) supervision;verbal cues   Assistive Device (Toilet Transfer) raised toilet seat   Balance   Balance Assessment standing dynamic balance   Standing Balance: Dynamic supervision   Balance Comments Holding vanity for support.   Activities of Daily Living   BADL Assessment/Intervention upper body dressing;lower body dressing   Upper Body Dressing Assessment/Training   Position (Upper Body Dressing) supported sitting   [place] Level (Upper Body Dressing) upper body dressing skills;don;pull-over garment;supervision;verbal cues   Lower Body Dressing Assessment/Training   Position (Lower Body Dressing) supported sitting;unsupported standing   [place] Level (Lower Body Dressing) lower body dressing skills;don;pants/bottoms;shoes/slippers;supervision;verbal cues   Clinical Impression   Criteria for Skilled Therapeutic Interventions Met (OT) Yes, treatment indicated   OT Diagnosis decreased indep with ADLs and trsfs due to Cervical Discectomy/Fusion at C6-T1   OT Problem List-Impairments impacting ADL problems related to;mobility;balance;motor control;post-surgical precautions   Assessment of Occupational Performance 3-5 Performance Deficits   Identified Performance Deficits dressing, toileting, bathing, trsfs   Planned Therapy Interventions (OT) ADL retraining;transfer training;home program guidelines   Clinical Decision Making Complexity (OT) detailed assessment/moderate complexity   Risk & Benefits of therapy have been explained evaluation/treatment results reviewed;participants included;patient   OT Total Evaluation Time   OT Eval, Moderate Complexity Minutes (39809) 15   Therapy Certification   Medical Diagnosis Cervical Discectomy/Fusion C6-T1   Start of Care Date 08/27/24   Certification date from 08/27/24   Certification date to 09/27/24   OT Goals   Therapy Frequency (OT) One time eval and treatment   OT Predicted Duration/Target Date for Goal Attainment 08/27/24   OT Goals  Hygiene/Grooming;Upper Body Dressing;Lower Body Dressing;Toilet Transfer/Toileting   OT: Hygiene/Grooming modified independent;while standing;within precautions;Completed   OT: Upper Body Dressing Independent;within precautions;Completed   OT: Lower Body Dressing Modified independent;within precautions;Completed   OT: Toilet Transfer/Toileting Modified independent;within precautions;Completed   Interventions   Interventions Quick Adds Self-Care/Home Management   Self-Care/Home Management   Self-Care/Home Mgmt/ADL, Compensatory, Meal Prep Minutes (07104) 25   Symptoms Noted During/After Treatment (Meal Preparation/Planning Training) none   Treatment Detail/Skilled Intervention Pt resting in bed when therapist arrived.  Pt not wearing her collar in bed.  Soft collar worn when OOB.  Pt was SBA with bed mobility.  Worked on room trsfs.  Pt declined to use the FWW.  Prefers to walk around the room without an assistive device - which is her norm at home.  Uses an elect scooter when outside of the house.  Pt has a CP gait pattern - will have PT see Pt prior to discharge.  OT encouraged Pt to use the walker as a safety precautions to her cervical surgery as Pt tends to reach out for furniture or therapist's hand.  Pt does have PCA help at home, too.  Completed trsfs to BR toilet with SBA/CGA of one.  Used GB on R side of toilet for support with mango care and clothing management.  Stood at sink for G/H cares - initially needed VC for posture and to use a cup for rinsing and spitting to avoid forward flex of her head/neck.  Completed FB dressing with VC for posture.  Encouraged Pt to use her reacher to avoid bending forward.  Pt reports she has a reacher at home -just needs to find it.  Reviewed with Pt the tub/shower trsf using the shower chair.  Reviewed car trsf and Pt verbalized understanding.  At end of session, Pt was sitting on EOB with bed alarm on and call light in reach.  RN aware.   OT Discharge Planning   OT Plan  D/C OT.  Will have PT see Pt for safe gait.   OT Discharge Recommendation (DC Rec) (S)  home with assist   OT Rationale for DC Rec Pt has support at home with PCA services 22-24 hrs per day.  Pt does have 2 small children but the children's father will be there to assist with their care.   OT Brief overview of current status SBA to mod indep with ADLs and trsfs   Total Session Time   Timed Code Treatment Minutes 25   Total Session Time (sum of timed and untimed services) 40    [facility]  OUTPATIENT OCCUPATIONAL THERAPY  EVALUATION  PLAN OF TREATMENT FOR OUTPATIENT REHABILITATION  (COMPLETE FOR INITIAL CLAIMS ONLY)  Patient's Last Name, First Name, M.I.  YOB: 1985  [place]                          Provider's Name  [facility] Medical Record No.  [id number]                             Onset Date:  08/26/24   Start of Care Date:  08/27/24   Type:     ___PT   _X_OT   ___SLP Medical Diagnosis:  Cervical Discectomy/Fusion C6-T1                    OT Diagnosis:  decreased indep with ADLs and trsfs due to Cervical Discectomy/Fusion at C6-T1 Visits from SOC:  1     See note for plan of treatment, functional goals and certification details    I CERTIFY THE NEED FOR THESE SERVICES FURNISHED UNDER        THIS PLAN OF TREATMENT AND WHILE UNDER MY CARE     (Physician co-signature of this document indicates review and certification of the therapy plan).

## 2024-08-27 NOTE — PROGRESS NOTES
"[facility] Progress Note      Post-operative Day: 1 Day Post-Op    Procedure(s):  Cervical 6 - Thoracic 1 Anterior Cervical Discectomy and Fusion      Subjective: Patient seen up resting in bedside. Reports pain tolerable on PO analgesics. No new numbness, tingling or weakness. Denies feeling lightheaded or dizzy. Hx of PONV, none noted this AM. Arm pain from prior to surgery improved. Able to swallow without difficulty, voiding and passing gas.     Pain: moderate  Chest pain, SOB:  No      Objective:  Blood pressure 129/88, pulse (!) 46, temperature 98.2  F (36.8  C), temperature source Oral, resp. rate 14, height 1.549 m (5' 1\"), weight 69.9 kg (154 lb), last menstrual period 08/14/2024, SpO2 96%, unknown if currently breastfeeding.    Patient Vitals for the past 24 hrs:   BP Temp Temp src Pulse Resp SpO2   08/27/24 0846 129/88 98.2  F (36.8  C) Oral (!) 46 14 96 %   08/27/24 0215 112/58 -- -- 52 16 99 %   08/26/24 2100 113/56 97.7  F (36.5  C) Oral 50 14 96 %   08/26/24 1900 117/71 97.4  F (36.3  C) Oral 50 14 97 %   08/26/24 1800 115/65 97.8  F (36.6  C) Oral 60 12 99 %   08/26/24 1730 (!) 144/71 97.6  F (36.4  C) Oral 71 10 100 %   08/26/24 1700 111/65 97.5  F (36.4  C) Oral 50 17 100 %   08/26/24 1600 117/71 -- -- (!) 47 -- --   08/26/24 1530 (!) 144/70 97.3  F (36.3  C) Bladder 52 13 99 %   08/26/24 1520 126/80 -- -- 70 15 99 %   08/26/24 1510 118/70 -- -- 55 18 99 %   08/26/24 1450 123/70 -- -- 87 19 96 %   08/26/24 1440 107/53 -- -- 73 12 98 %   08/26/24 1430 -- -- -- -- -- 97 %   08/26/24 1420 128/76 -- -- 89 10 97 %   08/26/24 1410 133/77 -- -- 89 18 99 %   08/26/24 1400 125/75 -- -- 92 16 98 %   08/26/24 1357 121/66 97.7  F (36.5  C) Temporal 91 16 98 %       Wt Readings from Last 4 Encounters:   08/26/24 69.9 kg (154 lb)   08/03/19 65.8 kg (145 lb)   11/10/16 83 kg (183 lb)   08/08/16 89.8 kg (198 lb)       General: Alert and orientated, NAD  Respiratory: Non-labored breathing on RA  BUE " and BLE motor function, sensation, and circulation intact   Yes, Dorsiflexion/plantarflexion intact and equal bilaterally. Moves all other extremities with ease and purpose   Wound status: incisions are clean dry and intact. Yes  Calf tenderness: Bilateral  No    Pertinent Labs   Lab Results: personally reviewed.     Recent Labs   Lab Test 11/10/16  1330   WBC 7.8   HGB 13.4   HCT 38.6   MCV 88             Plan:   Anticoagulation protocol: Mechanical and/or ambulation     Pain medications:  scopainmedication: oxycodone and tylenol  Weight bearing status: The patient will be on strict no heavy lifting, twisting, or bending requirements for the next 3 months. Soft collar for comfort   Cervical X-ray prior to discharge  Disposition:  Home today pending clearance from hospitalist and therapies    Continue cares and rehabilitation     Report completed by:  KADEEM Bueno CNP  Date: 8/27/2024  Time: 6:34 AM
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
## 2024-09-07 ENCOUNTER — HEALTH MAINTENANCE LETTER (OUTPATIENT)
Age: 39
End: 2024-09-07

## (undated) DEVICE — PREP CHLORAPREP W/ORANGE TINT 10.5ML 930715

## (undated) DEVICE — SUCTION MANIFOLD NEPTUNE 2 SYS 1 PORT 702-025-000

## (undated) DEVICE — IMM COLLAR CERVICAL MED UNIVERSAL 2.5X24" 79-83520

## (undated) DEVICE — DRSG PRIMAPORE 03 1/8X6" 66000318

## (undated) DEVICE — SOL NACL 0.9% IRRIG 1000ML BOTTLE 2F7124

## (undated) DEVICE — RX SURGIFLO HEMOSTATIC MATRIX 8ML 2991

## (undated) DEVICE — DRAPE STERI TOWEL LG 1010

## (undated) DEVICE — GLOVE UNDER INDICATOR PI SZ 7.0 LF 41670

## (undated) DEVICE — Device

## (undated) DEVICE — SU PDS II 3-0 SH 27" Z316H

## (undated) DEVICE — GOWN LG DISP 9515

## (undated) DEVICE — DRAPE THYROID

## (undated) DEVICE — GLOVE BIOGEL PI ULTRATOUCH G SZ 6.5 42165

## (undated) DEVICE — CATH IV 14GA 2IN REM FLASHPLUG DEHP-FR PVC FR 4251717-02

## (undated) DEVICE — DRAPE C-ARM 60X42" 1013

## (undated) DEVICE — SPONGE KITNER DISSECTING 7102*

## (undated) DEVICE — ESU PENCIL SMOKE EVAC W/ROCKER SWITCH 0703-047-000

## (undated) DEVICE — CUSTOM PACK LUMBAR FUSION SNE5BLFHEA

## (undated) DEVICE — PROTECTOR ARM STANDARD ONE STEP 40433 (COI)

## (undated) DEVICE — ELECTRODE PATIENT RETURN ADULT L10 FT 2 PLATE CORD 0855C

## (undated) DEVICE — GLOVE BIOGEL PI SZ 7.5 40875

## (undated) DEVICE — ESU ELEC BLADE 2.75" COATED/INSULATED E1455

## (undated) DEVICE — PIN DISTRACTION 12MM TI BLUE DP-12-TB

## (undated) DEVICE — SOL WATER IRRIG 1000ML BOTTLE 2F7114

## (undated) DEVICE — DRSG STERI STRIP 1/2X4" R1547

## (undated) DEVICE — SU MONOCRYL+ 4-0 18IN PS2 UND MCP496G

## (undated) DEVICE — TOOL DISSECT MIDAS MR8 14CM MATCH HEAD 3MM MR8-14MH30

## (undated) DEVICE — PACK MINOR SINGLE BASIN SSK3001

## (undated) RX ORDER — DEXAMETHASONE SODIUM PHOSPHATE 10 MG/ML
INJECTION, EMULSION INTRAMUSCULAR; INTRAVENOUS
Status: DISPENSED
Start: 2024-08-26

## (undated) RX ORDER — PROPOFOL 10 MG/ML
INJECTION, EMULSION INTRAVENOUS
Status: DISPENSED
Start: 2024-08-26

## (undated) RX ORDER — FENTANYL CITRATE 50 UG/ML
INJECTION, SOLUTION INTRAMUSCULAR; INTRAVENOUS
Status: DISPENSED
Start: 2024-08-26

## (undated) RX ORDER — GLYCOPYRROLATE 0.2 MG/ML
INJECTION, SOLUTION INTRAMUSCULAR; INTRAVENOUS
Status: DISPENSED
Start: 2024-08-26

## (undated) RX ORDER — LIDOCAINE HYDROCHLORIDE 10 MG/ML
INJECTION, SOLUTION EPIDURAL; INFILTRATION; INTRACAUDAL; PERINEURAL
Status: DISPENSED
Start: 2024-08-26

## (undated) RX ORDER — ONDANSETRON 2 MG/ML
INJECTION INTRAMUSCULAR; INTRAVENOUS
Status: DISPENSED
Start: 2024-08-26